# Patient Record
Sex: FEMALE | Race: WHITE | Employment: FULL TIME | URBAN - METROPOLITAN AREA
[De-identification: names, ages, dates, MRNs, and addresses within clinical notes are randomized per-mention and may not be internally consistent; named-entity substitution may affect disease eponyms.]

---

## 2021-06-25 ENCOUNTER — TRANSCRIBE ORDER (OUTPATIENT)
Dept: SCHEDULING | Age: 46
End: 2021-06-25

## 2021-06-25 DIAGNOSIS — M79.631 PAIN OF RIGHT FOREARM: Primary | ICD-10-CM

## 2021-07-08 ENCOUNTER — HOSPITAL ENCOUNTER (OUTPATIENT)
Age: 46
Discharge: HOME | End: 2021-07-08
Payer: COMMERCIAL

## 2021-07-08 DIAGNOSIS — M79.631 PAIN OF RIGHT FOREARM: ICD-10-CM

## 2021-07-08 PROCEDURE — 73218 MRI UPPER EXTREMITY W/O DYE: CPT

## 2024-06-27 ENCOUNTER — OFFICE VISIT (OUTPATIENT)
Age: 49
End: 2024-06-27
Payer: COMMERCIAL

## 2024-06-27 DIAGNOSIS — M25.511 CHRONIC RIGHT SHOULDER PAIN: Primary | ICD-10-CM

## 2024-06-27 DIAGNOSIS — M19.011 PRIMARY OSTEOARTHRITIS OF RIGHT SHOULDER: ICD-10-CM

## 2024-06-27 DIAGNOSIS — G89.29 CHRONIC RIGHT SHOULDER PAIN: Primary | ICD-10-CM

## 2024-06-27 PROCEDURE — 99203 OFFICE O/P NEW LOW 30 MIN: CPT | Performed by: ORTHOPAEDIC SURGERY

## 2024-06-27 PROCEDURE — 20611 DRAIN/INJ JOINT/BURSA W/US: CPT | Performed by: ORTHOPAEDIC SURGERY

## 2024-06-27 PROCEDURE — 73030 X-RAY EXAM OF SHOULDER: CPT | Performed by: ORTHOPAEDIC SURGERY

## 2024-06-27 RX ORDER — LIDOCAINE HYDROCHLORIDE 10 MG/ML
6 INJECTION, SOLUTION INFILTRATION; PERINEURAL ONCE
Status: COMPLETED | OUTPATIENT
Start: 2024-06-27 | End: 2024-07-03

## 2024-06-27 RX ORDER — TRIAMCINOLONE ACETONIDE 40 MG/ML
40 INJECTION, SUSPENSION INTRA-ARTICULAR; INTRAMUSCULAR ONCE
Status: COMPLETED | OUTPATIENT
Start: 2024-06-27 | End: 2024-07-03

## 2024-06-27 NOTE — PROGRESS NOTES
Faiza Thorne  1975   Chief Complaint   Patient presents with    Arm Pain     Patient complaints of right arm pain, muscle spasms, and difficulty sleeping. Patient states she takes 1,000mg of Tylenol and Ibuprofen 2-3x a day.        HISTORY OF PRESENT ILLNESS  Faiza Thorne is a 48 y.o. female who presents today for evaluation of right shoulder pain.  Pain is a 5/10. Pain has been present for 5 years. Pt previously underwent open right biceps tendon repair in 2021. Pt states that her surgeon originally went in arthroscopically and then converted to an open procedure which revealed severe arthritis in her right shoulder. Pt notes that her age prevented her surgeon from converting her original surgery to a shoulder replacement. Pt states that her pain is in her right bicep and not her right shoulder. Pt experiences grinding in her right shoulder. She also experiences right shoulder and arm stiffness. She experiences pain with overhead activities.     Has tried following treatments: Injections:Yes; Brace:No; Therapy:Yes; Cane/Crutch:No      Not on File     Past Medical History:   Diagnosis Date    COVID-19 vaccine series completed     2 DOSES    Depression     Eczema of scalp     Hypertension     Ill-defined condition     RIGHT SHOULDER PAIN    Morbid obesity (HCC)       Social History       Tobacco History       Smoking Status  Former Quit Date  1/1/2019 Smoking Tobacco Type  Cigarettes quit in 1/1/2019   Pack Year History     Packs/Day From To Years    0 1/1/2019  5.5      Smokeless Tobacco Use  Never              Alcohol History       Alcohol Use Status  Yes              Drug Use       Drug Use Status  Never              Sexual Activity       Sexually Active  Not Asked                   Past Surgical History:   Procedure Laterality Date    COLONOSCOPY N/A 4/11/2022    SCREENING COLONOSCOPY performed by Woo Rodriguez MD at [facility] ENDOSCOPY    SHOULDER ARTHROSCOPY Right     TONSILLECTOMY  1980

## 2024-07-03 RX ADMIN — TRIAMCINOLONE ACETONIDE 40 MG: 40 INJECTION, SUSPENSION INTRA-ARTICULAR; INTRAMUSCULAR at 11:16

## 2024-07-03 RX ADMIN — LIDOCAINE HYDROCHLORIDE 6 ML: 10 INJECTION, SOLUTION INFILTRATION; PERINEURAL at 11:16

## 2024-07-16 NOTE — PROGRESS NOTES
Faiza Thorne  1975   Chief Complaint   Patient presents with    Shoulder Pain     right        HISTORY OF PRESENT ILLNESS  Faiza Thorne is a 48 y.o. female who presents today for reevaluation of right shoulder pain. Pain is a 1/10. Pt received a right shoulder Cortisone injection on 6/27/2024 with relief. She notes pain with activities of daily living. She has clicking and popping. She has full ROM. She is currently taking Ibuprofen 800 mg.     Patient denies any fever, chills, chest pain, shortness of breath or calf pain. The remainder of the review of systems is negative. There are no new illness or injuries other than that mentioned above to report since last seen in the office. No changes in medications, allergies, social or family history.      PHYSICAL EXAM:   There were no vitals taken for this visit.  The patient is a well-developed, well-nourished female   in no acute distress.  The patient is alert and oriented times three.  The patient is alert and oriented times three. Mood and affect are normal.  LYMPHATIC: lymph nodes are not enlarged and are within normal limits  SKIN: normal in color and non tender to palpation. There are no bruises or abrasions noted.   NEUROLOGICAL: Motor sensory exam is within normal limits. Reflexes are equal bilaterally. There is normal sensation to pinprick and light touch  MUSCULOSKELETAL: Unchanged    PROCEDURE: none    IMAGING:   XR of the right shoulder with 3 views dated 6/27/2024 obtained in office read and reviewed by Dr. Sahni: Marked degenerative changes in the glenohumeral joint with a large inferior humeral spur       IMPRESSION:      ICD-10-CM    1. Primary osteoarthritis of right shoulder  M19.011 celecoxib (CELEBREX) 200 MG capsule           PLAN:   1. Pt presents today with right shoulder pain secondary to primary OA.  Think she has gotten reasonable relief with the injection.  We discussed treatment options including a total shoulder replacement. I

## 2024-07-18 ENCOUNTER — OFFICE VISIT (OUTPATIENT)
Age: 49
End: 2024-07-18
Payer: COMMERCIAL

## 2024-07-18 DIAGNOSIS — M19.011 PRIMARY OSTEOARTHRITIS OF RIGHT SHOULDER: Primary | ICD-10-CM

## 2024-07-18 PROCEDURE — 99213 OFFICE O/P EST LOW 20 MIN: CPT | Performed by: ORTHOPAEDIC SURGERY

## 2024-07-18 RX ORDER — CELECOXIB 200 MG/1
200 CAPSULE ORAL DAILY
Qty: 60 CAPSULE | Refills: 3 | Status: SHIPPED | OUTPATIENT
Start: 2024-07-18

## 2024-12-12 ENCOUNTER — OFFICE VISIT (OUTPATIENT)
Age: 49
End: 2024-12-12
Payer: COMMERCIAL

## 2024-12-12 DIAGNOSIS — M19.011 PRIMARY OSTEOARTHRITIS OF RIGHT SHOULDER: Primary | ICD-10-CM

## 2024-12-12 PROCEDURE — 99213 OFFICE O/P EST LOW 20 MIN: CPT | Performed by: ORTHOPAEDIC SURGERY

## 2024-12-12 NOTE — PROGRESS NOTES
not providing relief I believe the next steps will be to order a CT arthrogram to further evaluate the right shoulder assess the possibility of rotator cuff tear.  I think at this time we will start him to look at next steps which in all likelihood would include shoulder replacement.  Risk factors include: Obesity  2. No cortisone injection indicated today   3. No Physical/Occupational Therapy indicated today  4. Yes diagnostic test indicated today:   5. No durable medical equipment indicated today  6. No referral indicated today   7. No medications indicated today:   8. No Narcotic indicated today for short term acute pain secondary to  primary OA. Patient given pain medication for short term acute pain relief. Goal is to treat patient according to above plan and to ultimately have patient off all pain medications once appropriate. If chronic pain management is required beyond what is expected for current orthopedic problem, will refer patient to pain management.  was reviewed and will be reviewed with every medication refill request.       RTC following CT       By signing my name below, I VIDAL Akers, attest that this documentation has been prepared under the direction and in the presence of Ryan Rome MD  Electronically signed: VIDAL Akers. 12/12/2024 12:41 PM EST    I, Ryan Rome MD, personally performed the services described in this documentation. I have authorized the scribe to complete the medical record entries input within this chart. I have reviewed the chart and agree that the record reflects my personal performance and is accurate and complete. [Electronically Signed: Ryan Rome MD. 12/12/2024 12:41 PM EST]

## 2024-12-12 NOTE — PATIENT INSTRUCTIONS
If we order a Diagnostic test (such as MRI or CT) during your office visit please see below:     Coordination of Care will be calling you to schedule your diagnostic test. If you have not heard from Coordination of Care within 3 business days, please call or text [phone].     Once you have a date scheduled for your diagnostic test, you will need to contact our office to schedule a follow up appointment, as this is when the physician will review your diagnostic test results with you. You can contact our office to schedule appointment by phone at [phone], or you can send a message via ePar[employer] to request an appointment.    CT arthrogram ordered today, 12/12/2024

## 2024-12-31 ENCOUNTER — HOSPITAL ENCOUNTER (OUTPATIENT)
Facility: HOSPITAL | Age: 49
Discharge: HOME | End: 2025-01-03
Attending: ORTHOPAEDIC SURGERY
Payer: COMMERCIAL

## 2024-12-31 DIAGNOSIS — M19.011 PRIMARY OSTEOARTHRITIS OF RIGHT SHOULDER: ICD-10-CM

## 2024-12-31 PROCEDURE — 2500000003 HC RX 250 WO HCPCS: Performed by: ORTHOPAEDIC SURGERY

## 2024-12-31 PROCEDURE — 6360000004 HC RX CONTRAST MEDICATION: Performed by: ORTHOPAEDIC SURGERY

## 2024-12-31 PROCEDURE — 20610 DRAIN/INJ JOINT/BURSA W/O US: CPT

## 2024-12-31 PROCEDURE — 73201 CT UPPER EXTREMITY W/DYE: CPT

## 2024-12-31 PROCEDURE — 6360000002 HC RX W HCPCS: Performed by: ORTHOPAEDIC SURGERY

## 2024-12-31 RX ORDER — SODIUM CHLORIDE 0.9 % (FLUSH) 0.9 %
5-40 SYRINGE (ML) INJECTION 2 TIMES DAILY
Status: DISCONTINUED | OUTPATIENT
Start: 2024-12-31 | End: 2025-01-04 | Stop reason: HOSPADM

## 2024-12-31 RX ORDER — IOPAMIDOL 408 MG/ML
20 INJECTION, SOLUTION INTRATHECAL
Status: COMPLETED | OUTPATIENT
Start: 2024-12-31 | End: 2024-12-31

## 2024-12-31 RX ORDER — LIDOCAINE HYDROCHLORIDE 10 MG/ML
10 INJECTION, SOLUTION EPIDURAL; INFILTRATION; INTRACAUDAL; PERINEURAL
Status: COMPLETED | OUTPATIENT
Start: 2024-12-31 | End: 2024-12-31

## 2024-12-31 RX ADMIN — SODIUM CHLORIDE, PRESERVATIVE FREE 10 ML: 5 INJECTION INTRAVENOUS at 10:26

## 2024-12-31 RX ADMIN — LIDOCAINE HYDROCHLORIDE 10 ML: 10 INJECTION, SOLUTION EPIDURAL; INFILTRATION; INTRACAUDAL; PERINEURAL at 10:26

## 2024-12-31 RX ADMIN — IOPAMIDOL 20 ML: 408 INJECTION, SOLUTION INTRATHECAL at 10:26

## 2025-01-07 ENCOUNTER — OFFICE VISIT (OUTPATIENT)
Age: 50
End: 2025-01-07
Payer: COMMERCIAL

## 2025-01-07 DIAGNOSIS — M19.011 PRIMARY OSTEOARTHRITIS OF RIGHT SHOULDER: Primary | ICD-10-CM

## 2025-01-07 PROCEDURE — 99214 OFFICE O/P EST MOD 30 MIN: CPT | Performed by: ORTHOPAEDIC SURGERY

## 2025-01-07 NOTE — PROGRESS NOTES
Faiza Thorne  1975   Chief Complaint   Patient presents with    Results     CT scan f/u        HISTORY OF PRESENT ILLNESS  Faiza Thorne is a 49 y.o. female who presents today for reevaluation of right shoulder pain. Pain is a 4/10. She notes previous treatments have not provided relief. She is unable to engage in activities in daily living such as washing dishes, working, and washing her hair. She has limited ROM.    Patient denies any fever, chills, chest pain, shortness of breath or calf pain. The remainder of the review of systems is negative. There are no new illness or injuries other than that mentioned above to report since last seen in the office. No changes in medications, allergies, social or family history.      PHYSICAL EXAM:   There were no vitals taken for this visit.  The patient is a well-developed, well-nourished female   in no acute distress.  The patient is alert and oriented times three.  The patient is alert and oriented times three. Mood and affect are normal.  LYMPHATIC: lymph nodes are not enlarged and are within normal limits  SKIN: normal in color and non tender to palpation. There are no bruises or abrasions noted.   NEUROLOGICAL: Motor sensory exam is within normal limits. Reflexes are equal bilaterally. There is normal sensation to pinprick and light touch  MUSCULOSKELETAL: Unchanged still significant decrease in range of motion with pain         PROCEDURE: none    IMAGING: CT arthrogram of the right shoulder obtained by [facility] on 12/31/2024 reviewed and read by :    IMPRESSION:  1. Moderate/severe glenohumeral osteoarthritis.  2. No significant rotator cuff muscle atrophy.     XR of the right shoulder with 3 views dated 6/27/2024 obtained in office read and reviewed by Dr. Sahni: Marked degenerative changes in the glenohumeral joint with a large inferior humeral spur       IMPRESSION:      ICD-10-CM    1. Primary osteoarthritis of right shoulder  M19.011            PLAN:

## 2025-01-10 DIAGNOSIS — Z01.818 PREOP EXAMINATION: Primary | ICD-10-CM

## 2025-02-20 ENCOUNTER — PREP FOR PROCEDURE (OUTPATIENT)
Age: 50
End: 2025-02-20

## 2025-02-20 DIAGNOSIS — M19.011 ARTHRITIS OF RIGHT SHOULDER REGION: ICD-10-CM

## 2025-03-14 ENCOUNTER — HOSPITAL ENCOUNTER (OUTPATIENT)
Facility: HOSPITAL | Age: 50
Discharge: HOME | End: 2025-03-17
Payer: COMMERCIAL

## 2025-03-14 ENCOUNTER — TRANSCRIBE ORDERS (OUTPATIENT)
Facility: HOSPITAL | Age: 50
End: 2025-03-14

## 2025-03-14 DIAGNOSIS — Z01.818 PREOP EXAMINATION: Primary | ICD-10-CM

## 2025-03-14 DIAGNOSIS — Z01.818 PREOP EXAMINATION: ICD-10-CM

## 2025-03-14 LAB
ABO + RH BLD: NORMAL
ANION GAP SERPL CALC-SCNC: 6 MMOL/L (ref 3–18)
APPEARANCE UR: ABNORMAL
APTT PPP: 25.7 SEC (ref 23–36.4)
BACTERIA URNS QL MICRO: ABNORMAL /HPF
BILIRUB UR QL: NEGATIVE
BLOOD GROUP ANTIBODIES SERPL: NORMAL
BUN SERPL-MCNC: 12 MG/DL (ref 7–18)
BUN/CREAT SERPL: 15 (ref 12–20)
CALCIUM SERPL-MCNC: 9.8 MG/DL (ref 8.5–10.1)
CHLORIDE SERPL-SCNC: 96 MMOL/L (ref 100–111)
CO2 SERPL-SCNC: 33 MMOL/L (ref 21–32)
COLOR UR: YELLOW
CREAT SERPL-MCNC: 0.78 MG/DL (ref 0.6–1.3)
EKG ATRIAL RATE: 66 BPM
EKG DIAGNOSIS: NORMAL
EKG P AXIS: 70 DEGREES
EKG P-R INTERVAL: 172 MS
EKG Q-T INTERVAL: 404 MS
EKG QRS DURATION: 74 MS
EKG QTC CALCULATION (BAZETT): 423 MS
EKG R AXIS: 60 DEGREES
EKG T AXIS: 64 DEGREES
EKG VENTRICULAR RATE: 66 BPM
EPITH CASTS URNS QL MICRO: ABNORMAL /LPF (ref 0–5)
ERYTHROCYTE [DISTWIDTH] IN BLOOD BY AUTOMATED COUNT: 13.3 % (ref 11.6–14.5)
GLUCOSE SERPL-MCNC: 88 MG/DL (ref 74–99)
GLUCOSE UR STRIP.AUTO-MCNC: NEGATIVE MG/DL
HCT VFR BLD AUTO: 46.3 % (ref 35–45)
HGB BLD-MCNC: 14.9 G/DL (ref 12–16)
HGB UR QL STRIP: NEGATIVE
INR PPP: 0.9 (ref 0.9–1.1)
KETONES UR QL STRIP.AUTO: ABNORMAL MG/DL
LEUKOCYTE ESTERASE UR QL STRIP.AUTO: NEGATIVE
MCH RBC QN AUTO: 28.7 PG (ref 24–34)
MCHC RBC AUTO-ENTMCNC: 32.2 G/DL (ref 31–37)
MCV RBC AUTO: 89 FL (ref 78–100)
NITRITE UR QL STRIP.AUTO: NEGATIVE
NRBC # BLD: 0 K/UL (ref 0–0.01)
NRBC BLD-RTO: 0 PER 100 WBC
PH UR STRIP: 5 (ref 5–8)
PLATELET # BLD AUTO: 270 K/UL (ref 135–420)
PMV BLD AUTO: 9.6 FL (ref 9.2–11.8)
POTASSIUM SERPL-SCNC: 3.8 MMOL/L (ref 3.5–5.5)
PROT UR STRIP-MCNC: ABNORMAL MG/DL
PROTHROMBIN TIME: 12.6 SEC (ref 11.9–14.9)
RBC # BLD AUTO: 5.2 M/UL (ref 4.2–5.3)
RBC #/AREA URNS HPF: ABNORMAL /HPF (ref 0–5)
SODIUM SERPL-SCNC: 135 MMOL/L (ref 136–145)
SP GR UR REFRACTOMETRY: >1.03 (ref 1–1.03)
SPECIMEN EXP DATE BLD: NORMAL
UROBILINOGEN UR QL STRIP.AUTO: 1 EU/DL (ref 0.2–1)
WBC # BLD AUTO: 8.4 K/UL (ref 4.6–13.2)
WBC URNS QL MICRO: ABNORMAL /HPF (ref 0–4)

## 2025-03-14 PROCEDURE — 86850 RBC ANTIBODY SCREEN: CPT

## 2025-03-14 PROCEDURE — 86901 BLOOD TYPING SEROLOGIC RH(D): CPT

## 2025-03-14 PROCEDURE — 80048 BASIC METABOLIC PNL TOTAL CA: CPT

## 2025-03-14 PROCEDURE — 93005 ELECTROCARDIOGRAM TRACING: CPT

## 2025-03-14 PROCEDURE — 71046 X-RAY EXAM CHEST 2 VIEWS: CPT

## 2025-03-14 PROCEDURE — 36415 COLL VENOUS BLD VENIPUNCTURE: CPT

## 2025-03-14 PROCEDURE — 85610 PROTHROMBIN TIME: CPT

## 2025-03-14 PROCEDURE — 85730 THROMBOPLASTIN TIME PARTIAL: CPT

## 2025-03-14 PROCEDURE — 86900 BLOOD TYPING SEROLOGIC ABO: CPT

## 2025-03-14 PROCEDURE — 81001 URINALYSIS AUTO W/SCOPE: CPT

## 2025-03-14 PROCEDURE — 93010 ELECTROCARDIOGRAM REPORT: CPT | Performed by: INTERNAL MEDICINE

## 2025-03-14 PROCEDURE — 85027 COMPLETE CBC AUTOMATED: CPT

## 2025-03-14 RX ORDER — ESCITALOPRAM OXALATE 20 MG/1
20 TABLET ORAL DAILY
COMMUNITY
Start: 2025-02-14

## 2025-03-14 RX ORDER — TRAMADOL HYDROCHLORIDE 50 MG/1
1 TABLET ORAL EVERY 6 HOURS
COMMUNITY
Start: 2024-09-12

## 2025-03-14 RX ORDER — SENNOSIDES 8.6 MG
1300 CAPSULE ORAL 2 TIMES DAILY
COMMUNITY

## 2025-03-14 RX ORDER — GUSELKUMAB 100 MG/ML
100 INJECTION SUBCUTANEOUS
COMMUNITY

## 2025-03-14 RX ORDER — IBUPROFEN 200 MG
4 TABLET ORAL 2 TIMES DAILY
COMMUNITY

## 2025-03-14 RX ORDER — SEMAGLUTIDE 1 MG/.5ML
1 INJECTION, SOLUTION SUBCUTANEOUS
COMMUNITY
Start: 2024-09-12

## 2025-03-14 RX ORDER — LISINOPRIL AND HYDROCHLOROTHIAZIDE 20; 25 MG/1; MG/1
1 TABLET ORAL DAILY
COMMUNITY
Start: 2025-02-10

## 2025-03-14 NOTE — PROGRESS NOTES
Instructions for your surgery at [facility]      Today's Date:  3/14/2025      Patient's Name:  Faiza Thorne           Surgery Date:  March 27, 2025              Please enter the main entrance of the hospital and check-in at the front security desk located in the lobby. They will direct you to the area to report for your surgery.     Do NOT eat or drink anything, including candy, gum, or ice chips after midnight prior to your surgery, unless you have specific instructions from your surgeon or anesthesia provider to do so.  Brush your teeth before coming to the hospital. You may swish with water, but do not swallow.  No smoking/Vaping/E-Cigarettes 24 hours prior to the day of surgery.  No alcohol 24 hours prior to the day of surgery.  No recreational drugs for one week prior to the day of surgery.  Bring Photo ID, Insurance information, and Co-pay if required on day of surgery.  Bring in pertinent legal documents, such as, Medical Power of , DNR, Advance Directive, etc.  Leave all valuables, including money/purse, weapons at home.  Remove all jewelry, including ALL body piercings, nail polish, acrylic nails, and makeup (including mascara); no lotions, powders, deodorant, or perfume/cologne/after shave on the skin.  Follow instruction for Hibiclens washes and CHG wipes from surgeon's office.   Glasses and dentures may be worn to the hospital. They must be removed prior to surgery. Please bring case/container for glasses or dentures.   Contact lenses should not be worn on day of surgery.   Call your doctor's office if symptoms of a cold or illness develop within 24-48 hours prior to your surgery.  Call your doctor's office if you have any questions concerning insurance or co-pays.  15. AN ADULT (relative or friend 18 years or older) MUST DRIVE YOU HOME AFTER YOUR SURGERY.  16. Please make arrangements for a responsible adult (18 years or older) to be with you for 24 hours after your

## 2025-03-14 NOTE — DISCHARGE INSTRUCTIONS
Dr. Sahni’s Total Shoulder Postoperative Information    How to manage your pain after your Surgery:  After your surgery it is expected that you will have some pain.  In efforts to reduce the amounts of side effects from pain medications we would like you to follow the below medication regimen after surgery:  Take 1000mg of Tylenol by mouth every 8 hours x 5 days. This is 4 tabs of regular strength Tylenol or 2 tabs of Extra Strength Tylenol  We would like you to take a NSAID medication for the first 3 days following surgery.  In efforts to avoid additional prescription costs we recommend one of the following over the counter medications.     600mg of Ibuprofen every 8 hours x 3 days    OR   500mg of Naproxen (Aleve) every 12 hours x 3 days  If you have a prescription for Meloxicam or Celebrex already you may resume this as previously prescribed instead of the Over the Counter Medications.  DO NOT TAKE ANY OF THESE IF YOU HAVE CHRONIC RENAL FAILURE, ARE TAKING A BLOOD THINNER, HAVE A HISTORY OF A GASTRIC BLEED OR ARE ALLERGIC TO ASPIRIN. IT IS OK TO TAKE IF YOU HAVE HISTORY OF GASTRIC BYPASS SURGERY.    Then ONLY IF YOUR PAIN IS UNCONTROLLED you may take your Narcotic Pain medication as prescribed. THIS IS THE PRESCRIPTION THAT WAS GIVEN TO YOU IN THE OFFICE.    You should place an ice bag over your shoulder to help with pain and reduce swelling.    Your shoulder may be sore and develop bruising over the next several days.  The bruising should resolve and no special care will be needed.    Leave your bandage on until we see you in the office next week. It is safe to take a shower when you get home    You will be given a sling to use. Continue to wear this while you are active or up and moving around. OK to take off if you are sedentary. No moving the arm away from your body on your own, reaching or carrying with the operated arm.      There has been an operation inside and around the shoulder joint. Complete

## 2025-03-19 DIAGNOSIS — N30.00 ACUTE CYSTITIS WITHOUT HEMATURIA: Primary | ICD-10-CM

## 2025-03-19 RX ORDER — CIPROFLOXACIN 250 MG/1
500 TABLET, FILM COATED ORAL 2 TIMES DAILY
Qty: 28 TABLET | Refills: 0 | Status: SHIPPED | OUTPATIENT
Start: 2025-03-19 | End: 2025-03-26

## 2025-03-21 ENCOUNTER — OFFICE VISIT (OUTPATIENT)
Age: 50
End: 2025-03-21

## 2025-03-21 VITALS
BODY MASS INDEX: 51.05 KG/M2 | SYSTOLIC BLOOD PRESSURE: 145 MMHG | HEIGHT: 62 IN | WEIGHT: 277.4 LBS | DIASTOLIC BLOOD PRESSURE: 83 MMHG

## 2025-03-21 DIAGNOSIS — M19.011 PRIMARY OSTEOARTHRITIS OF RIGHT SHOULDER: Primary | ICD-10-CM

## 2025-03-21 DIAGNOSIS — Z01.818 PREOP EXAMINATION: ICD-10-CM

## 2025-03-21 PROCEDURE — 99024 POSTOP FOLLOW-UP VISIT: CPT | Performed by: NURSE PRACTITIONER

## 2025-03-21 RX ORDER — OXYCODONE HYDROCHLORIDE 5 MG/1
5 TABLET ORAL EVERY 4 HOURS PRN
Qty: 40 TABLET | Refills: 0 | Status: SHIPPED | OUTPATIENT
Start: 2025-03-21 | End: 2025-03-28

## 2025-03-21 NOTE — PROGRESS NOTES
MG tablet Take 4 tablets by mouth in the morning and at bedtime And prn    lisinopril-hydroCHLOROthiazide (PRINZIDE;ZESTORETIC) 20-25 MG per tablet Take 1 tablet by mouth daily    WEGOVY 1 MG/0.5ML SOAJ SC injection Inject 1 mg into the skin every 7 days Every Saturday    TREMFYA 100 MG/ML SOAJ Inject 100 mg into the skin every 2 months    traMADol (ULTRAM) 50 MG tablet Take 1 tablet by mouth every 6 hours.    escitalopram (LEXAPRO) 20 MG tablet Take 1 tablet by mouth daily    celecoxib (CELEBREX) 200 MG capsule Take 1 capsule by mouth daily (Patient not taking: Reported on 3/14/2025)    traZODone (DESYREL) 100 MG tablet Take 1 tablet by mouth nightly as needed     No current facility-administered medications for this visit.         ALLERGIES:     No Known Allergies      SURGICAL HISTORY:     Past Surgical History:   Procedure Laterality Date    COLONOSCOPY N/A 4/11/2022    SCREENING COLONOSCOPY performed by Woo Rodriguez MD at [facility] ENDOSCOPY    SHOULDER ARTHROSCOPY Right     TONSILLECTOMY  1980    TUBAL LIGATION      TYMPANOSTOMY TUBE PLACEMENT Bilateral     as a child        SOCIAL HISTORY:     Social History       Tobacco History       Smoking Status  Every Day Last Attempt to Quit  1/1/2019 Smoking Tobacco Type  Cigarettes last attempt to quit 1/1/2019   Pack Year History     Packs/Day From To Years    0 1/1/2019  6.2      Smokeless Tobacco Use  Never      Tobacco Comments  Smokes 1 or 2 daily              Alcohol History       Alcohol Use Status  Not Currently              Drug Use       Drug Use Status  Never              Sexual Activity       Sexually Active  Not Asked                     FAMILY HISTORY:     Family History   Problem Relation Age of Onset    Hypertension Mother        REVIEW OF SYSTEMS:     Negative for fevers, chills, chest pain, shortness of breath, weight loss, recent illness     General: Negative for fever and chills. No unexpected change in weight.  Denies fatigue. No change in

## 2025-03-25 NOTE — H&P
HISTORY AND PHYSICAL          Patient: Faiza Thorne                MRN: [id number]       SSN: [id number]  YOB: 1975          AGE: 49 y.o.          SEX: female      Patient scheduled for:  RIGHT STEAMLESS TOTAL SHOULDER REPLACEMENT 3/27/2025    Surgeon: Ryan Sahni MD    ANESTHESIA TYPE:  General    HISTORY:     The patient was seen in the office today for a preoperative history and physical for an upcoming above listed surgery.  The patient is a pleasant 49 y.o. female who has a history of arthritis right shoulder . Pain level is a 6/10.      Due to the current findings, affected activity of daily living and continued pain and discomfort, surgical intervention is indicated. The alternatives, risks, and complications, including but not limited to infection, blood loss, need for blood transfusion, neurovascular damage, perla-incisional numbness, subcutaneous hematoma, bone fracture, anesthetic complications, DVT, PE, death, RSD, postoperative stiffness and pain, possible surgical scar, delayed healing and nonhealing, reflexive sympathetic dystrophy, damage to blood vessels and nerves, need for more surgery, MI, and stroke,  failure of hardware, gait disturbances,have been discussed.  The patient understands and wishes to proceed with surgery.     PAST MEDICAL HISTORY:     Past Medical History:   Diagnosis Date    Arthritis     COVID-19 vaccine series completed     2 DOSES    Depression     Eczema of scalp     Hypertension     Ill-defined condition     RIGHT SHOULDER PAIN    Morbid obesity     Psoriasis     Subclinical hypothyroidism     no meds        CURRENT MEDICATIONS:     No current facility-administered medications for this encounter.     Current Outpatient Medications   Medication Sig    acetaminophen (TYLENOL 8 HOUR ARTHRITIS PAIN) 650 MG extended release tablet Take 2 tablets by mouth in the morning and at bedtime And as needed    ibuprofen (ADVIL;MOTRIN) 200 MG tablet Take 4

## 2025-03-26 ENCOUNTER — ANESTHESIA EVENT (OUTPATIENT)
Facility: HOSPITAL | Age: 50
End: 2025-03-26
Payer: COMMERCIAL

## 2025-03-27 ENCOUNTER — ANESTHESIA (OUTPATIENT)
Facility: HOSPITAL | Age: 50
End: 2025-03-27
Payer: COMMERCIAL

## 2025-03-27 ENCOUNTER — HOSPITAL ENCOUNTER (OUTPATIENT)
Facility: HOSPITAL | Age: 50
Setting detail: OUTPATIENT SURGERY
Discharge: HOME | End: 2025-03-27
Attending: ORTHOPAEDIC SURGERY | Admitting: ORTHOPAEDIC SURGERY
Payer: COMMERCIAL

## 2025-03-27 VITALS
HEIGHT: 62 IN | HEART RATE: 91 BPM | BODY MASS INDEX: 51.45 KG/M2 | DIASTOLIC BLOOD PRESSURE: 69 MMHG | SYSTOLIC BLOOD PRESSURE: 126 MMHG | WEIGHT: 279.6 LBS | RESPIRATION RATE: 18 BRPM | OXYGEN SATURATION: 94 % | TEMPERATURE: 98.2 F

## 2025-03-27 LAB — HCG UR QL: NEGATIVE

## 2025-03-27 PROCEDURE — 6360000002 HC RX W HCPCS: Performed by: NURSE ANESTHETIST, CERTIFIED REGISTERED

## 2025-03-27 PROCEDURE — 6370000000 HC RX 637 (ALT 250 FOR IP): Performed by: NURSE ANESTHETIST, CERTIFIED REGISTERED

## 2025-03-27 PROCEDURE — 2580000003 HC RX 258: Performed by: NURSE ANESTHETIST, CERTIFIED REGISTERED

## 2025-03-27 PROCEDURE — L3650 SO 8 ABD RESTRAINT PRE OTS: HCPCS | Performed by: ORTHOPAEDIC SURGERY

## 2025-03-27 PROCEDURE — C1713 ANCHOR/SCREW BN/BN,TIS/BN: HCPCS | Performed by: ORTHOPAEDIC SURGERY

## 2025-03-27 PROCEDURE — 3700000001 HC ADD 15 MINUTES (ANESTHESIA): Performed by: ORTHOPAEDIC SURGERY

## 2025-03-27 PROCEDURE — 2500000003 HC RX 250 WO HCPCS: Performed by: NURSE ANESTHETIST, CERTIFIED REGISTERED

## 2025-03-27 PROCEDURE — 3600000002 HC SURGERY LEVEL 2 BASE: Performed by: ORTHOPAEDIC SURGERY

## 2025-03-27 PROCEDURE — 6360000002 HC RX W HCPCS: Performed by: ANESTHESIOLOGY

## 2025-03-27 PROCEDURE — 7100000001 HC PACU RECOVERY - ADDTL 15 MIN: Performed by: ORTHOPAEDIC SURGERY

## 2025-03-27 PROCEDURE — 2720000010 HC SURG SUPPLY STERILE: Performed by: ORTHOPAEDIC SURGERY

## 2025-03-27 PROCEDURE — 7100000000 HC PACU RECOVERY - FIRST 15 MIN: Performed by: ORTHOPAEDIC SURGERY

## 2025-03-27 PROCEDURE — 2500000003 HC RX 250 WO HCPCS: Performed by: NURSE PRACTITIONER

## 2025-03-27 PROCEDURE — 6360000002 HC RX W HCPCS: Performed by: NURSE PRACTITIONER

## 2025-03-27 PROCEDURE — 64415 NJX AA&/STRD BRCH PLXS IMG: CPT | Performed by: ANESTHESIOLOGY

## 2025-03-27 PROCEDURE — 3600000012 HC SURGERY LEVEL 2 ADDTL 15MIN: Performed by: ORTHOPAEDIC SURGERY

## 2025-03-27 PROCEDURE — 81025 URINE PREGNANCY TEST: CPT

## 2025-03-27 PROCEDURE — 3700000000 HC ANESTHESIA ATTENDED CARE: Performed by: ORTHOPAEDIC SURGERY

## 2025-03-27 PROCEDURE — 2580000003 HC RX 258: Performed by: ORTHOPAEDIC SURGERY

## 2025-03-27 PROCEDURE — 6360000002 HC RX W HCPCS: Performed by: ORTHOPAEDIC SURGERY

## 2025-03-27 PROCEDURE — 7100000011 HC PHASE II RECOVERY - ADDTL 15 MIN: Performed by: ORTHOPAEDIC SURGERY

## 2025-03-27 PROCEDURE — 7100000010 HC PHASE II RECOVERY - FIRST 15 MIN: Performed by: ORTHOPAEDIC SURGERY

## 2025-03-27 PROCEDURE — 6370000000 HC RX 637 (ALT 250 FOR IP): Performed by: ORTHOPAEDIC SURGERY

## 2025-03-27 PROCEDURE — 2709999900 HC NON-CHARGEABLE SUPPLY: Performed by: ORTHOPAEDIC SURGERY

## 2025-03-27 DEVICE — ECLIPSE CAGE SCREW M 35MM: Type: IMPLANTABLE DEVICE | Site: SHOULDER | Status: FUNCTIONAL

## 2025-03-27 DEVICE — CEMENT BNE 40 GM RADIOPAQUE BA SIMPLEX P: Type: IMPLANTABLE DEVICE | Site: SHOULDER | Status: FUNCTIONAL

## 2025-03-27 DEVICE — IMPLANTABLE DEVICE: Type: IMPLANTABLE DEVICE | Site: SHOULDER | Status: FUNCTIONAL

## 2025-03-27 DEVICE — SYSTEM IMPLANT ECLIPSE SPEEDCAP: Type: IMPLANTABLE DEVICE | Site: SHOULDER | Status: FUNCTIONAL

## 2025-03-27 DEVICE — ANCHOR SUTURE BIOCOMP 4.75X19.1 MM SWIVELOCK C: Type: IMPLANTABLE DEVICE | Site: SHOULDER | Status: FUNCTIONAL

## 2025-03-27 DEVICE — COMPONENT GLEN FIX SM SHLDR UNIVERS VAULTLOCK: Type: IMPLANTABLE DEVICE | Site: SHOULDER | Status: FUNCTIONAL

## 2025-03-27 DEVICE — DEVICE GRFT FIX 4.75X19.1 MM ANCHR IMPL BIOCOMP SWIVELOCK: Type: IMPLANTABLE DEVICE | Site: SHOULDER | Status: FUNCTIONAL

## 2025-03-27 RX ORDER — FENTANYL CITRATE 50 UG/ML
25 INJECTION, SOLUTION INTRAMUSCULAR; INTRAVENOUS EVERY 5 MIN PRN
Status: DISCONTINUED | OUTPATIENT
Start: 2025-03-27 | End: 2025-03-27 | Stop reason: HOSPADM

## 2025-03-27 RX ORDER — LIDOCAINE HYDROCHLORIDE 10 MG/ML
1 INJECTION, SOLUTION EPIDURAL; INFILTRATION; INTRACAUDAL; PERINEURAL
Status: DISCONTINUED | OUTPATIENT
Start: 2025-03-27 | End: 2025-03-27 | Stop reason: HOSPADM

## 2025-03-27 RX ORDER — GLUCAGON 1 MG
1 KIT INJECTION PRN
Status: DISCONTINUED | OUTPATIENT
Start: 2025-03-27 | End: 2025-03-27 | Stop reason: HOSPADM

## 2025-03-27 RX ORDER — DEXTROSE MONOHYDRATE 100 MG/ML
INJECTION, SOLUTION INTRAVENOUS CONTINUOUS PRN
Status: DISCONTINUED | OUTPATIENT
Start: 2025-03-27 | End: 2025-03-27 | Stop reason: HOSPADM

## 2025-03-27 RX ORDER — LIDOCAINE HYDROCHLORIDE 20 MG/ML
INJECTION, SOLUTION EPIDURAL; INFILTRATION; INTRACAUDAL; PERINEURAL
Status: DISCONTINUED | OUTPATIENT
Start: 2025-03-27 | End: 2025-03-27 | Stop reason: SDUPTHER

## 2025-03-27 RX ORDER — PROMETHAZINE HYDROCHLORIDE 12.5 MG/1
12.5 TABLET ORAL ONCE
Status: COMPLETED | OUTPATIENT
Start: 2025-03-27 | End: 2025-03-27

## 2025-03-27 RX ORDER — PHENYLEPHRINE HCL IN 0.9% NACL 1 MG/10 ML
SYRINGE (ML) INTRAVENOUS
Status: DISCONTINUED | OUTPATIENT
Start: 2025-03-27 | End: 2025-03-27 | Stop reason: SDUPTHER

## 2025-03-27 RX ORDER — ROPIVACAINE HYDROCHLORIDE 5 MG/ML
30 INJECTION, SOLUTION EPIDURAL; INFILTRATION; PERINEURAL ONCE
Status: COMPLETED | OUTPATIENT
Start: 2025-03-27 | End: 2025-03-27

## 2025-03-27 RX ORDER — FAMOTIDINE 20 MG/1
20 TABLET, FILM COATED ORAL ONCE
Status: COMPLETED | OUTPATIENT
Start: 2025-03-27 | End: 2025-03-27

## 2025-03-27 RX ORDER — FENTANYL CITRATE 50 UG/ML
INJECTION, SOLUTION INTRAMUSCULAR; INTRAVENOUS
Status: DISCONTINUED | OUTPATIENT
Start: 2025-03-27 | End: 2025-03-27 | Stop reason: SDUPTHER

## 2025-03-27 RX ORDER — ROPIVACAINE HYDROCHLORIDE 5 MG/ML
INJECTION, SOLUTION EPIDURAL; INFILTRATION; PERINEURAL
Status: COMPLETED | OUTPATIENT
Start: 2025-03-27 | End: 2025-03-27

## 2025-03-27 RX ORDER — ROCURONIUM BROMIDE 10 MG/ML
INJECTION, SOLUTION INTRAVENOUS
Status: DISCONTINUED | OUTPATIENT
Start: 2025-03-27 | End: 2025-03-27 | Stop reason: SDUPTHER

## 2025-03-27 RX ORDER — SODIUM CHLORIDE, SODIUM LACTATE, POTASSIUM CHLORIDE, CALCIUM CHLORIDE 600; 310; 30; 20 MG/100ML; MG/100ML; MG/100ML; MG/100ML
INJECTION, SOLUTION INTRAVENOUS CONTINUOUS
Status: DISCONTINUED | OUTPATIENT
Start: 2025-03-27 | End: 2025-03-27 | Stop reason: HOSPADM

## 2025-03-27 RX ORDER — DEXAMETHASONE SODIUM PHOSPHATE 4 MG/ML
INJECTION, SOLUTION INTRA-ARTICULAR; INTRALESIONAL; INTRAMUSCULAR; INTRAVENOUS; SOFT TISSUE
Status: DISCONTINUED | OUTPATIENT
Start: 2025-03-27 | End: 2025-03-27 | Stop reason: SDUPTHER

## 2025-03-27 RX ORDER — ONDANSETRON 2 MG/ML
4 INJECTION INTRAMUSCULAR; INTRAVENOUS
Status: DISCONTINUED | OUTPATIENT
Start: 2025-03-27 | End: 2025-03-27 | Stop reason: HOSPADM

## 2025-03-27 RX ORDER — KETOROLAC TROMETHAMINE 15 MG/ML
INJECTION, SOLUTION INTRAMUSCULAR; INTRAVENOUS
Status: DISCONTINUED | OUTPATIENT
Start: 2025-03-27 | End: 2025-03-27 | Stop reason: SDUPTHER

## 2025-03-27 RX ORDER — PROPOFOL 10 MG/ML
INJECTION, EMULSION INTRAVENOUS
Status: DISCONTINUED | OUTPATIENT
Start: 2025-03-27 | End: 2025-03-27 | Stop reason: SDUPTHER

## 2025-03-27 RX ORDER — SUCCINYLCHOLINE/SOD CL,ISO/PF 100 MG/5ML
SYRINGE (ML) INTRAVENOUS
Status: DISCONTINUED | OUTPATIENT
Start: 2025-03-27 | End: 2025-03-27 | Stop reason: SDUPTHER

## 2025-03-27 RX ORDER — MIDAZOLAM HYDROCHLORIDE 2 MG/2ML
2 INJECTION, SOLUTION INTRAMUSCULAR; INTRAVENOUS ONCE
Status: COMPLETED | OUTPATIENT
Start: 2025-03-27 | End: 2025-03-27

## 2025-03-27 RX ORDER — NALOXONE HYDROCHLORIDE 0.4 MG/ML
INJECTION, SOLUTION INTRAMUSCULAR; INTRAVENOUS; SUBCUTANEOUS PRN
Status: DISCONTINUED | OUTPATIENT
Start: 2025-03-27 | End: 2025-03-27 | Stop reason: HOSPADM

## 2025-03-27 RX ORDER — FENTANYL CITRATE 50 UG/ML
100 INJECTION, SOLUTION INTRAMUSCULAR; INTRAVENOUS ONCE
Status: COMPLETED | OUTPATIENT
Start: 2025-03-27 | End: 2025-03-27

## 2025-03-27 RX ORDER — SODIUM CHLORIDE 0.9 % (FLUSH) 0.9 %
5-40 SYRINGE (ML) INJECTION PRN
Status: DISCONTINUED | OUTPATIENT
Start: 2025-03-27 | End: 2025-03-27 | Stop reason: HOSPADM

## 2025-03-27 RX ORDER — ONDANSETRON 2 MG/ML
INJECTION INTRAMUSCULAR; INTRAVENOUS
Status: DISCONTINUED | OUTPATIENT
Start: 2025-03-27 | End: 2025-03-27 | Stop reason: SDUPTHER

## 2025-03-27 RX ADMIN — FENTANYL CITRATE 50 MCG: 50 INJECTION INTRAMUSCULAR; INTRAVENOUS at 07:40

## 2025-03-27 RX ADMIN — WATER 3000 MG: 1 INJECTION INTRAMUSCULAR; INTRAVENOUS; SUBCUTANEOUS at 07:45

## 2025-03-27 RX ADMIN — FENTANYL CITRATE 25 MCG: 50 INJECTION INTRAMUSCULAR; INTRAVENOUS at 09:59

## 2025-03-27 RX ADMIN — Medication 160 MG: at 07:40

## 2025-03-27 RX ADMIN — SODIUM CHLORIDE, SODIUM LACTATE, POTASSIUM CHLORIDE, AND CALCIUM CHLORIDE: 600; 310; 30; 20 INJECTION, SOLUTION INTRAVENOUS at 09:03

## 2025-03-27 RX ADMIN — ROPIVACAINE HYDROCHLORIDE 20 ML: 5 INJECTION, SOLUTION EPIDURAL; INFILTRATION; PERINEURAL at 07:20

## 2025-03-27 RX ADMIN — ROCURONIUM BROMIDE 10 MG: 50 INJECTION INTRAVENOUS at 09:06

## 2025-03-27 RX ADMIN — ROCURONIUM BROMIDE 10 MG: 50 INJECTION INTRAVENOUS at 07:40

## 2025-03-27 RX ADMIN — Medication 50 MCG: at 10:04

## 2025-03-27 RX ADMIN — Medication 200 MCG: at 07:49

## 2025-03-27 RX ADMIN — FAMOTIDINE 20 MG: 20 TABLET, FILM COATED ORAL at 06:57

## 2025-03-27 RX ADMIN — ONDANSETRON 4 MG: 2 INJECTION, SOLUTION INTRAMUSCULAR; INTRAVENOUS at 10:23

## 2025-03-27 RX ADMIN — MIDAZOLAM HYDROCHLORIDE 2 MG: 1 INJECTION, SOLUTION INTRAMUSCULAR; INTRAVENOUS at 07:24

## 2025-03-27 RX ADMIN — KETOROLAC TROMETHAMINE 15 MG: 15 INJECTION, SOLUTION INTRAMUSCULAR; INTRAVENOUS at 10:34

## 2025-03-27 RX ADMIN — FENTANYL CITRATE 100 MCG: 0.05 INJECTION, SOLUTION INTRAMUSCULAR; INTRAVENOUS at 07:24

## 2025-03-27 RX ADMIN — DEXAMETHASONE SODIUM PHOSPHATE 8 MG: 4 INJECTION INTRA-ARTICULAR; INTRALESIONAL; INTRAMUSCULAR; INTRAVENOUS; SOFT TISSUE at 07:45

## 2025-03-27 RX ADMIN — ROCURONIUM BROMIDE 40 MG: 50 INJECTION INTRAVENOUS at 07:53

## 2025-03-27 RX ADMIN — PROPOFOL 200 MG: 10 INJECTION, EMULSION INTRAVENOUS at 07:40

## 2025-03-27 RX ADMIN — ROPIVACAINE HYDROCHLORIDE 30 ML: 5 INJECTION EPIDURAL; INFILTRATION; PERINEURAL at 07:28

## 2025-03-27 RX ADMIN — SODIUM CHLORIDE, SODIUM LACTATE, POTASSIUM CHLORIDE, AND CALCIUM CHLORIDE: 600; 310; 30; 20 INJECTION, SOLUTION INTRAVENOUS at 06:46

## 2025-03-27 RX ADMIN — LIDOCAINE HYDROCHLORIDE 100 MG: 20 INJECTION, SOLUTION EPIDURAL; INFILTRATION; INTRACAUDAL; PERINEURAL at 07:40

## 2025-03-27 RX ADMIN — SUGAMMADEX 200 MG: 100 INJECTION, SOLUTION INTRAVENOUS at 10:41

## 2025-03-27 RX ADMIN — Medication 50 MCG: at 08:40

## 2025-03-27 RX ADMIN — PROMETHAZINE HYDROCHLORIDE 12.5 MG: 12.5 TABLET ORAL at 06:57

## 2025-03-27 RX ADMIN — FENTANYL CITRATE 50 MCG: 50 INJECTION INTRAMUSCULAR; INTRAVENOUS at 08:07

## 2025-03-27 RX ADMIN — ROCURONIUM BROMIDE 20 MG: 50 INJECTION INTRAVENOUS at 08:20

## 2025-03-27 RX ADMIN — FENTANYL CITRATE 25 MCG: 50 INJECTION INTRAMUSCULAR; INTRAVENOUS at 09:27

## 2025-03-27 ASSESSMENT — PAIN - FUNCTIONAL ASSESSMENT: PAIN_FUNCTIONAL_ASSESSMENT: 0-10

## 2025-03-27 ASSESSMENT — PAIN SCALES - GENERAL
PAINLEVEL_OUTOF10: 0

## 2025-03-27 NOTE — ANESTHESIA POSTPROCEDURE EVALUATION
Department of Anesthesiology  Postprocedure Note    Patient: Faiza Thorne  MRN: [id number]  YOB: 1975  Date of evaluation: 3/27/2025    Procedure Summary       Date: 03/27/25 Room / Location: [facility] MAIN 05 / [facility] MAIN OR    Anesthesia Start: 0730 Anesthesia Stop: 1103    Procedure: RIGHT STEMLESS SHOULDER TOTAL ARTHROPLASTY; [[employer]]; NERVE BLOCK (Right: Shoulder) Diagnosis:       Arthritis of right shoulder region      (Arthritis of right shoulder region [M19.011])    Surgeons: Ryan Rome MD Responsible Provider: Suraj Rubalcava Jr., MD    Anesthesia Type: General, Regional ASA Status: 3            Anesthesia Type: General, Regional    Edwardo Phase I: Edwardo Score: 10    Edwardo Phase II: Edwardo Score: 10    Anesthesia Post Evaluation    Patient location during evaluation: bedside  Patient participation: complete - patient participated  Level of consciousness: awake  Airway patency: patent  Nausea & Vomiting: no nausea  Cardiovascular status: hemodynamically stable  Respiratory status: acceptable  Hydration status: euvolemic  Multimodal analgesia pain management approach  Pain management: adequate    No notable events documented.

## 2025-03-27 NOTE — OP NOTE
Operative Note      Patient: Faiza Thorne  YOB: 1975  MRN: [id number]    Date of Procedure: 3/27/2025    Pre-Op Diagnosis Codes:      * Arthritis of right shoulder region [M19.011]    Post-Op Diagnosis:  Glenohumeral arthritis right shoulder       Procedure(s):  RIGHT STEMLESS SHOULDER TOTAL ARTHROPLASTY; [[employer]]; NERVE BLOCK    Surgeon(s):  Ryan Rome MD    Assistant:   Surgical Assistant: Rajesh Fraire Danny    Anesthesia: General    Estimated Blood Loss (mL): less than 100     Complications: None    Specimens:   * No specimens in log *    Implants:  Implant Name Type Inv. Item Serial No.  Lot No. LRB No. Used Action   CEMENT BNE 40 GM RADIOPAQUE BA SIMPLEX P - [id number]  CEMENT BNE 40 GM RADIOPAQUE BA SIMPLEX P  [employer] [facility] [id number] Right 1 Implanted   COMPONENT AMBER FIX [facility] - [id number]  COMPONENT AMBER FIX [facility]  [employer] [id number] Right 1 Implanted   ECLIPSE TRUNION 39MM SLOTTED TPS CAP - [id number]  ECLIPSE TRUNION 39MM SLOTTED TPS CAP  [employer] [id number] Right 1 Implanted   ECLIPSE CAGE SCREW M 35MM - [id number]  ECLIPSE CAGE SCREW M 35MM  [employer] [id number] Right 1 Implanted   SYSTEM IMPLANT ECLIPSE SPEEDCAP - [id number]  SYSTEM IMPLANT ECLIPSE SPEEDCAP  [employer] [id number] Right 1 Implanted   DEVICE GRFT FIX 4.75X19.1 MM ANCHR IMPL BIOCOMP SWIVELOCK - [id number]  DEVICE GRFT FIX 4.75X19.1 MM ANCHR IMPL BIOCOMP SWIVELOCK  [employer] [id number] Right 1 Implanted   ANCHOR SUTURE BIOCOMP 4.75X19.1 MM SWIVELOCK C - [id number]  ANCHOR SUTURE BIOCOMP 4.75X19.1 MM SWIVELOCK C  [employer] [id number] Right 1 Implanted   [employer] ECLIPSE HUMERAL HEAD 39/16 - [id number]  [employer] ECLIPSE HUMERAL HEAD 39/16  [employer] [id number] Right 1 Implanted         Drains: * No LDAs found *    Findings:  Infection Present At Time Of Surgery (PATOS) (choose all levels that have

## 2025-03-27 NOTE — ANESTHESIA PRE PROCEDURE
Department of Anesthesiology  Preprocedure Note       Name:  Faiza Thorne   Age:  49 y.o.  :  1975                                          MRN:  [id number]         Date:  3/27/2025      Surgeon: Surgeon(s):  Ryan Rome MD    Procedure: Procedure(s):  RIGHT STEMLESS SHOULDER TOTAL ARTHROPLASTY; [[employer]]; NERVE BLOCK    Medications prior to admission:   Prior to Admission medications    Medication Sig Start Date End Date Taking? Authorizing Provider   acetaminophen (TYLENOL 8 HOUR ARTHRITIS PAIN) 650 MG extended release tablet Take 2 tablets by mouth in the morning and at bedtime And as needed   Yes Provider, MD Xavi   ibuprofen (ADVIL;MOTRIN) 200 MG tablet Take 4 tablets by mouth in the morning and at bedtime And prn   Yes Provider, MD Xavi   lisinopril-hydroCHLOROthiazide (PRINZIDE;ZESTORETIC) 20-25 MG per tablet Take 1 tablet by mouth daily 2/10/25  Yes Provider, Historical, MD   WEGOVY 1 MG/0.5ML SOAJ SC injection Inject 1 mg into the skin every 7 days Every 24  Yes Provider, MD Xavi   TREMFYA 100 MG/ML SOAJ Inject 100 mg into the skin every 2 months   Yes Provider, MD Xavi   traMADol (ULTRAM) 50 MG tablet Take 1 tablet by mouth every 6 hours. 24  Yes ProviderXavi MD   escitalopram (LEXAPRO) 20 MG tablet Take 1 tablet by mouth daily 25  Yes ProviderXavi MD   traZODone (DESYREL) 100 MG tablet Take 1 tablet by mouth nightly as needed   Yes Automatic Reconciliation, Ar   oxyCODONE (ROXICODONE) 5 MG immediate release tablet Take 1 tablet by mouth every 4 hours as needed for Pain for up to 7 days. Take lowest dose possible to manage pain for acute post operative pain. Do not take until after surgery Max Daily Amount: 30 mg  Patient not taking: Reported on 3/27/2025 3/21/25 3/28/25  Marlene Mclean, ACNP   celecoxib (CELEBREX) 200 MG capsule Take 1 capsule by mouth daily  Patient not taking: Reported on 3/14/2025

## 2025-03-27 NOTE — BRIEF OP NOTE
Brief Postoperative Note      Patient: Faiza Thorne  YOB: 1975  MRN: [id number]    Date of Procedure: 3/27/2025    Pre-Op Diagnosis Codes:      * Arthritis of right shoulder region [M19.011]    Post-Op Diagnosis:  Glenohumeral arthritis right shoulder       Procedure(s):  RIGHT STEMLESS SHOULDER TOTAL ARTHROPLASTY; [[employer]]; NERVE BLOCK    Surgeon(s):  Ryan Rome MD    Assistant:  Surgical Assistant: Rajesh Fraire Danny    Anesthesia: General    Estimated Blood Loss (mL): less than 100     Complications: None    Specimens:   * No specimens in log *    Implants:  Implant Name Type Inv. Item Serial No.  Lot No. LRB No. Used Action   CEMENT BNE 40 GM RADIOPAQUE BA SIMPLEX P - [id number]  CEMENT BNE 40 GM RADIOPAQUE BA SIMPLEX P  [employer] [facility] [id number] Right 1 Implanted   COMPONENT AMBER FIX [facility] VAULTLOCK - [id number]  COMPONENT AMBER FIX [facility] VAULTLOCK  [employer] [id number] Right 1 Implanted   ECLIPSE TRUNION 39MM SLOTTED TPS CAP - [id number]  ECLIPSE TRUNION 39MM SLOTTED TPS CAP  [employer] [id number] Right 1 Implanted   ECLIPSE CAGE SCREW M 35MM - [id number]  ECLIPSE CAGE SCREW M 35MM  [employer] [id number] Right 1 Implanted   SYSTEM IMPLANT ECLIPSE SPEEDCAP - [id number]  SYSTEM IMPLANT ECLIPSE SPEEDCAP  [employer] [id number] Right 1 Implanted   DEVICE GRFT FIX 4.75X19.1 MM ANCHR IMPL BIOCOMP SWIVELOCK - [id number]  DEVICE GRFT FIX 4.75X19.1 MM ANCHR IMPL BIOCOMP SWIVELOCK  [employer] [id number] Right 1 Implanted   ANCHOR SUTURE BIOCOMP 4.75X19.1 MM SWIVELOCK C - [id number]  ANCHOR SUTURE BIOCOMP 4.75X19.1 MM SWIVELOCK C  [employer] [id number] Right 1 Implanted   [employer] ECLIPSE HUMERAL HEAD 39/16 - [id number]  [employer] ECLIPSE HUMERAL HEAD 39/16  [employer] [id number] Right 1 Implanted         Drains: * No LDAs found *    Findings:  Infection Present At Time Of Surgery (PATOS) (choose all levels

## 2025-03-27 NOTE — H&P
Update History & Physical    The patient's History and Physical was reviewed with the patient and I examined the patient. There was no change. The surgical site was confirmed by the patient and me.       Plan: The risks, benefits, expected outcome, and alternative to the recommended procedure have been discussed with the patient. Patient understands and wants to proceed with the procedure.     Electronically signed by Ryan Rome MD on 3/27/2025 at 7:22 AM

## 2025-03-27 NOTE — ANESTHESIA PROCEDURE NOTES
Peripheral Block    Patient location during procedure: holding area  Reason for block: post-op pain management and at surgeon's request  Start time: 3/27/2025 7:20 AM  End time: 3/27/2025 7:28 AM  Staffing  Performed: anesthesiologist   Anesthesiologist: Suraj Rubalcava Jr., MD  Performed by: Suraj Rubalcava Jr., MD  Authorized by: Suraj Rubalcava Jr., MD    Preanesthetic Checklist  Completed: patient identified, IV checked, site marked, risks and benefits discussed, surgical/procedural consents, equipment checked, pre-op evaluation, timeout performed, anesthesia consent given, oxygen available, monitors applied/VS acknowledged, fire risk safety assessment completed and verbalized and blood product R/B/A discussed and consented  Peripheral Block   Patient position: supine  Prep: ChloraPrep  Provider prep: sterile gloves and mask  Patient monitoring: cardiac monitor, continuous pulse ox, frequent blood pressure checks, IV access, oxygen and responsive to questions  Block type: Brachial plexus  Interscalene  Laterality: right  Injection technique: single-shot  Guidance: nerve stimulator and ultrasound guided  Local infiltration: lidocaine  Infiltration strength: 1 %  Local infiltration: lidocaine  Dose: 1 mL    Needle   Needle type: insulated echogenic nerve stimulator needle   Needle gauge: 22 G  Needle localization: nerve stimulator, ultrasound guidance and anatomical landmarks  Test dose: negative  Needle length: 8 cm  Assessment   Injection assessment: negative aspiration for heme, no paresthesia on injection, local visualized surrounding nerve on ultrasound and no intravascular symptoms  Paresthesia pain: none  Slow fractionated injection: yes  Hemodynamics: stable  Outcomes: uncomplicated    Medications Administered  ropivacaine (NAROPIN) injection 0.5% - Perineural   20 mL - 3/27/2025 7:20:00 AM

## 2025-04-03 ENCOUNTER — OFFICE VISIT (OUTPATIENT)
Age: 50
End: 2025-04-03

## 2025-04-03 DIAGNOSIS — M25.511 RIGHT SHOULDER PAIN, UNSPECIFIED CHRONICITY: ICD-10-CM

## 2025-04-03 DIAGNOSIS — Z96.611 STATUS POST TOTAL SHOULDER ARTHROPLASTY, RIGHT: Primary | ICD-10-CM

## 2025-04-03 NOTE — PROGRESS NOTES
Faiza Thorne  1975     HISTORY OF PRESENT ILLNESS  Faiza Thorne is a 49 y.o. female who presents today for evaluation s/p right stemless total shoulder arthroplasty on 3/27/2025. Patient has not been going to PT. Describes pain as a 2/10. Has been taking tylenol for the pain and has only needed a couple doses of the oxycodone. Still has night pain. Patient denies any fever, chills, chest pain, shortness of breath or calf pain. The remainder of the review of systems is negative. There are no new illness or injuries to report since last seen in the office. No changes in medications, allergies, social or family history.      PHYSICAL EXAM:   [facility] 03/05/2025    The patient is a well-developed, well-nourished female in no acute distress.  The patient is alert and oriented times three. The patient appears to be well groomed. Mood and affect are normal.  ORTHOPEDIC EXAM of right shoulder:  Inspection: swelling present,  Bruising present  Incision, clean, dry, intact, sutures in place  Passive glenohumeral abduction 0-25 degrees  Stability: Stable  Strength: n/a  2+ distal pulses    IMAGING: 3 view xray images of right shoulder taken in the office on 4/3/2025 read and reviewed by myself reveal stemless total arthroplasty hardware in excellent position     IMPRESSION:  S/P right stemless total shoulder arthroplasty    PLAN:   Incisions cleaned. Surgery was discussed at length today. Patient to continue with PROM and PT. Continue wearing sling. Stressed to patient that nothing causes an increase in pain.  RTC 4 weeks    Marlene Mclean, MILTONP-C  [facility]

## 2025-04-03 NOTE — THERAPY EVALUATION
[facility]  [address] [place] [address]  Phone: [phone]   [phone]  Plan of Care / Statement of Necessity for Physical Therapy Services     Patient Name: Faiza Thorne : 1975   Medical   Diagnosis: Primary osteoarthritis of right shoulder Treatment Diagnosis:   M25.511  RIGHT SHOULDER PAIN    Onset Date: DOS: 3/27/25     Referral Source: Marlene Mclean ACNP Start of Care (SOC): 2025   Prior Hospitalization: See medical history Provider #: [id number]   Prior Level of Function: Lives with family. Previously independent ADL/IADL's. 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 RHD female with subjective complaints of R shoulder pain.  KIM: Pt reports chronic R shoulder pain and is now s/p stemless shoulder replacement DOS 3/27/25. Pt had 1st f/u last week with MD and everything is progressing well.   Pt s/p R TSA DOS 3/27/25.    Current pain is rated as 0 to 10/10. States post op pain has been averaging 2-3/10  Current functional limitations: R arm in immobilizer, no active motion. Limited ADL/IADL's. No lifting/reaching.   QuickDASH score= 75% indicating severe impairment to functional activities.    Today's evaulation is significant for:  Fall Risk Assessment: Does the patient have a fear of falling? NO.   If yes, what fall risk assessment was performed?  N/A      POSTURE/OBSERVATION:  Pt received with RUE in immobilizer; good fit. Pt reporting good compliance with use of immobilizer at all times    PROM   CERVICAL: Grossly appears WNL  SHOULDER: (supine)  Flex R 95 deg, L WNL, ABD R 84, L WNL, ER (scaption) R 10 deg, L WNL.  IR (scaption) R 58 prior to soft tissue approximation, L WNL    STRENGTH  SHOULDER: deferred MMT on R 2/2 post op status. L shoulder grossly 5/5   Wrist flex/ext R 4+/5    SPECIAL TESTS:  n/a      These findings

## 2025-04-07 ENCOUNTER — HOSPITAL ENCOUNTER (OUTPATIENT)
Facility: HOSPITAL | Age: 50
Setting detail: RECURRING SERIES
Discharge: HOME | End: 2025-04-10
Payer: COMMERCIAL

## 2025-04-07 PROCEDURE — 97140 MANUAL THERAPY 1/> REGIONS: CPT

## 2025-04-07 PROCEDURE — 97162 PT EVAL MOD COMPLEX 30 MIN: CPT

## 2025-04-07 NOTE — PROGRESS NOTES
PHYSICAL / OCCUPATIONAL THERAPY - DAILY TREATMENT NOTE (updated 3/2025)  For Eval visit    Patient Name: Faiza Thorne    Date: 2025    : 1975  Insurance: Payor: HERVE CAMPBELL / Plan: [facility] [place] / Product Type: *No Product type* /      Patient  verified yes     Visit #   Current / Total 1 16-24   Time   In / Out 9:00 9:30   Pain   In / Out 0 0   Subjective Functional Status/Changes: See POC     TREATMENT AREA =  see POC    OBJECTIVE        20 min   Eval - untimed                      Therapeutic Procedures:    Tx Min Billable or 1:1 Min (if diff from Tx Min) Procedure, Rationale, Specifics   10  89655 Manual Therapy (timed):  decrease pain, increase ROM, and increase tissue extensibility to improve patient's ability to progress to PLOF and address remaining functional goals.  The manual therapy interventions were performed at a separate and distinct time from the therapeutic activities interventions . (see flow sheet as applicable)     Details if applicable:              Details if applicable:            Details if applicable:            Details if applicable:     10  [facility] Totals Reminder: bill using total billable min of TIMED therapeutic procedures (example: do not include dry needle or estim unattended, both untimed codes, in totals to left)  8-22 min = 1 unit; 23-37 min = 2 units; 38-52 min = 3 units; 53-67 min = 4 units; 68-82 min = 5 units   Total Total     Charge Capture    [x]  Patient Education billed concurrently with other procedures   [x] Review HEP    [] Progressed/Changed HEP, detail:    [] Other detail:       Objective Information/Functional Measures/Assessment    See POC    Patient will continue to benefit from skilled PT / OT services to modify and progress therapeutic interventions, analyze and address functional mobility deficits, analyze and address ROM deficits, analyze and address strength deficits, analyze and address soft tissue restrictions, analyze and cue for proper movement

## 2025-04-08 NOTE — PROGRESS NOTES
PHYSICAL / OCCUPATIONAL THERAPY - DAILY TREATMENT NOTE    Patient Name: Faiza Thorne    Date: 2025    : 1975  Insurance: Payor: HERVE CAMPBELL / Plan: [facility] [place] / Product Type: *No Product type* /      Patient  verified Yes     Visit #   Current / Total 2 16-24   Time   In / Out 7:37 8:10   Pain   In / Out 0 0   Subjective Functional Status/Changes: Pt states she did her HEP 3x yesterday. Her mom left so she did a little more activity with opposite arm and so her R arm felt a little more sore. Pt reports compliance with use of R immobilizer     TREATMENT AREA =  Primary osteoarthritis of right shoulder  Pain in right shoulder    OBJECTIVE      Therapeutic Procedures:    Tx Min Billable or 1:1 Min (if diff from Tx Min) Procedure, Rationale, Specifics    19613 Therapeutic Exercise (timed):  increase ROM, strength, coordination, balance, and proprioception to improve patient's ability to progress to PLOF and address remaining functional goals. (see flow sheet as applicable)     Details if applicable:        72861 Manual Therapy (timed):  decrease pain, increase ROM, and increase tissue extensibility to improve patient's ability to progress to PLOF and address remaining functional goals.  The manual therapy interventions were performed at a separate and distinct time from the therapeutic activities interventions . (see flow sheet as applicable)     Details if applicable:    STM R UT, rhomboids, LS in L s/l. R shoulder PROM all planes in supine          Details if applicable:            Details if applicable:            Details if applicable:     33 33 [facility] Totals Reminder: bill using total billable min of TIMED therapeutic procedures (example: do not include dry needle or estim unattended, both untimed codes, in totals to left)  8-22 min = 1 unit; 23-37 min = 2 units; 38-52 min = 3 units; 53-67 min = 4 units; 68-82 min = 5 units   Total Total     Charge Capture    [x]  Patient Education billed

## 2025-04-09 ENCOUNTER — HOSPITAL ENCOUNTER (OUTPATIENT)
Facility: HOSPITAL | Age: 50
Setting detail: RECURRING SERIES
Discharge: HOME | End: 2025-04-12
Payer: COMMERCIAL

## 2025-04-09 PROCEDURE — 97110 THERAPEUTIC EXERCISES: CPT

## 2025-04-09 PROCEDURE — 97140 MANUAL THERAPY 1/> REGIONS: CPT

## 2025-04-16 ENCOUNTER — HOSPITAL ENCOUNTER (OUTPATIENT)
Facility: HOSPITAL | Age: 50
Setting detail: RECURRING SERIES
Discharge: HOME | End: 2025-04-19
Payer: COMMERCIAL

## 2025-04-16 PROCEDURE — 97110 THERAPEUTIC EXERCISES: CPT

## 2025-04-16 PROCEDURE — 97140 MANUAL THERAPY 1/> REGIONS: CPT

## 2025-04-16 NOTE — PROGRESS NOTES
PHYSICAL / OCCUPATIONAL THERAPY - DAILY TREATMENT NOTE    Patient Name: Faiza Thorne    Date: 2025    : 1975  Insurance: Payor: HERVE CAMPBELL / [place]: [facility] [place] / Product Type: *No Product type* /      Patient  verified Yes     Visit #   Current / Total 3 16-24   Time   In / Out 739 813   Pain   In / Out 0/10 010   Subjective Functional Status/Changes: Patient reports she has been doing well since her LV. Patient is pleased that her pain levels have remained low.      TREATMENT AREA =  Primary osteoarthritis of right shoulder  Pain in right shoulder    OBJECTIVE         Therapeutic Procedures:    Tx Min Billable or 1:1 Min (if diff from Tx Min) Procedure, Rationale, Specifics    22194 Therapeutic Exercise (timed):  increase ROM, strength, coordination, balance, and proprioception to improve patient's ability to progress to PLOF and address remaining functional goals. (see flow sheet as applicable)     Details if applicable:       10 10 60571 Manual Therapy (timed):  decrease pain, increase ROM, increase tissue extensibility, and decrease trigger points to improve patient's ability to progress to PLOF and address remaining functional goals.  The manual therapy interventions were performed at a separate and distinct time from the therapeutic activities interventions . (see flow sheet as applicable)     Details if applicable:  STM to R infraspinatus, R teres minor, R UT; R shoulder PROM into flexion, ER and abduction all performed in semi-reclined supine position          Details if applicable:            Details if applicable:            Details if applicable:       [facility] Totals Reminder: bill using total billable min of TIMED therapeutic procedures (example: do not include dry needle or estim unattended, both untimed codes, in totals to left)  8-22 min = 1 unit; 23-37 min = 2 units; 38-52 min = 3 units; 53-67 min = 4 units; 68-82 min = 5 units   Total Total     Charge Capture    [x]  Patient 
Measures/Assessment  -***    Patient will continue to benefit from skilled PT / OT services to modify and progress therapeutic interventions, analyze and address functional mobility deficits, analyze and address ROM deficits, analyze and address strength deficits, analyze and address soft tissue restrictions, analyze and cue for proper movement patterns, analyze and modify for postural abnormalities, and instruct in home and community integration to address functional deficits and attain remaining goals.    Progress toward goals / Updated goals:  []  See Progress Note/Recertification      Short Term Goals: To be accomplished in 4 WEEKS  1.  Pt will be educated in/compliant with appropriate HEP to decrease pain***, increase ROM, increase strength and return pt to PLOF.    [place] at last note[place]certification[place] [facility]  [place]:  4/9: Goal progressing: good initial compliance   2. Pt will increase R shoulder flex/abd PROM by >/= 25 deg in order to improve ADL's  Status at last note/certification: Flex R 95 deg, ABD R 84  Current:     3. Pt will improve R shoulder ER PROM by >/= 20 deg for improved reaching.  Status at last note/certification: ER (scaption) 10 deg.  Current:        Long Term Goals: To be accomplished in 8 WEEKS  1. Pt will improve QuickDASH score to </=55% to demo a significant improvement in functional activity tolerance.  Status at last note/certification: 75%  Current:    2. Pt will improve R shoulder flex/abd AROM to >/=140 deg for improved reaching.   Status at last note/certification: PROM: Flex R 95 deg, ABD R 84  Current:     3. Pt will improve R shoulder ER AROM to >/= 45 deg for reaching.  Status at last note/certification: PROM: (scaption) 10 deg  Current:     4. Pt will initiate R shoulder strengthening protocol as cleared by MD to promote ability to resume normal carry/lifting function.  Status at last note/certification: n/a  Current:        Next PN/ RC due 5/7/25  Auth due (visit

## 2025-04-18 ENCOUNTER — HOSPITAL ENCOUNTER (OUTPATIENT)
Facility: HOSPITAL | Age: 50
Setting detail: RECURRING SERIES
Discharge: HOME | End: 2025-04-21
Payer: COMMERCIAL

## 2025-04-18 PROCEDURE — 97110 THERAPEUTIC EXERCISES: CPT

## 2025-04-18 PROCEDURE — 97140 MANUAL THERAPY 1/> REGIONS: CPT

## 2025-04-18 NOTE — PROGRESS NOTES
PHYSICAL / OCCUPATIONAL THERAPY - DAILY TREATMENT NOTE    Patient Name: Faiza Thorne    Date: 2025    : 1975  Insurance: Payor: HERVE CAMPBELL / [place]: [facility] [place] / Product Type: *No Product type* /      Patient  verified Yes     Visit #   Current / Total 4 16-24   Time   In / Out 700 745   Pain   In / Out 0/10 0/10   Subjective Functional Status/Changes: Pt states shoulder feel good today and reports no pain. Reports no issues with current exercises. Pt states she is able to sleep through the night though she is still in her recliner.      TREATMENT AREA =  Primary osteoarthritis of right shoulder  Pain in right shoulder    OBJECTIVE         Therapeutic Procedures:    Tx Min Billable or 1:1 Min (if diff from Tx Min) Procedure, Rationale, Specifics   35  50779 Therapeutic Exercise (timed):  increase ROM, strength, coordination, balance, and proprioception to improve patient's ability to progress to PLOF and address remaining functional goals. (see flow sheet as applicable)     Details if applicable:       10  18819 Manual Therapy (timed):  decrease pain, increase ROM, increase tissue extensibility, and decrease trigger points to improve patient's ability to progress to PLOF and address remaining functional goals.  The manual therapy interventions were performed at a separate and distinct time from the therapeutic activities interventions . (see flow sheet as applicable)     Details if applicable:  STM to R infraspinatus, R teres minor, R UT; R shoulder PROM into flexion, ER and abduction all performed in semi-reclined supine position           Details if applicable:            Details if applicable:            Details if applicable:     45  [facility] Totals Reminder: bill using total billable min of TIMED therapeutic procedures (example: do not include dry needle or estim unattended, both untimed codes, in totals to left)  8-22 min = 1 unit; 23-37 min = 2 units; 38-52 min = 3 units; 53-67 min = 4 units; 68-82

## 2025-04-22 ENCOUNTER — APPOINTMENT (OUTPATIENT)
Facility: HOSPITAL | Age: 50
End: 2025-04-22
Payer: COMMERCIAL

## 2025-04-23 ENCOUNTER — HOSPITAL ENCOUNTER (OUTPATIENT)
Facility: HOSPITAL | Age: 50
Setting detail: RECURRING SERIES
Discharge: HOME | End: 2025-04-26
Payer: COMMERCIAL

## 2025-04-23 PROCEDURE — 97140 MANUAL THERAPY 1/> REGIONS: CPT

## 2025-04-23 PROCEDURE — 97110 THERAPEUTIC EXERCISES: CPT

## 2025-04-23 NOTE — PROGRESS NOTES
both untimed codes, in totals to left)  8-22 min = 1 unit; 23-37 min = 2 units; 38-52 min = 3 units; 53-67 min = 4 units; 68-82 min = 5 units   Total Total     Charge Capture    [x]  Patient Education billed concurrently with other procedures   [x] Review HEP    [] Progressed/Changed HEP, detail:    [] Other detail:       Objective Information/Functional Measures/Assessment  Flex 135, ER (scap plane) 30    Pt demonstrates good improvement in shoulder PROM today, achieving STG for ER. Pt advised on continued compliance with use of immobilizer to optimize tissue healing and reduce risk for injury. Pt reports understanding. Pt ed on light touch, sensory stimulation to scar to improve nerve sensation as she reports numbness. Pt overall progressing well for post op status and will continue as per protocol.      Patient will continue to benefit from skilled PT / OT services to modify and progress therapeutic interventions, analyze and address functional mobility deficits, analyze and address ROM deficits, and analyze and address strength deficits to address functional deficits and attain remaining goals.    Progress toward goals / Updated goals:  []  See Progress Note/Recertification    Short Term Goals: To be accomplished in 4 WEEKS  1.  Pt will be educated in/compliant with appropriate HEP to decrease pain, increase ROM, increase strength and return pt to PLOF.    Status at last note/certification: issued at [facility]  Current:  4/9: Goal progressing: good initial compliance   2. Pt will increase R shoulder flex/abd PROM by >/= 25 deg in order to improve ADL's  Status at last note/certification: Flex R 95 deg, ABD R 84  Current:   (4/16/25) R shoulder flex PROM grossly 100, abd grossly 90, progressing with goal  3. Pt will improve R shoulder ER PROM by >/= 20 deg for improved reaching.  Status at last note/certification: ER (scaption) 10 deg.  Current:  4/23: Goal Met: ER (scaption) 30 deg      Long Term Goals: To be

## 2025-04-24 NOTE — PROGRESS NOTES
PHYSICAL / OCCUPATIONAL THERAPY - DAILY TREATMENT NOTE    Patient Name: Faiza Thorne    Date: 2025    : 1975  Insurance: Payor: HERVE CAMPBELL / [place]: [facility] [place] / Product Type: *No Product type* /      Patient  verified Yes     Visit #   Current / Total 6 16-24   Time   In / Out 703 745   Pain   In / Out 0/10 0/10   Subjective Functional Status/Changes: Pt states shoulder feels good. Reports no pain today.     TREATMENT AREA =  Primary osteoarthritis of right shoulder  Pain in right shoulder    OBJECTIVE         Therapeutic Procedures:    Tx Min Billable or 1:1 Min (if diff from Tx Min) Procedure, Rationale, Specifics   32  70955 Therapeutic Exercise (timed):  increase ROM, strength, coordination, balance, and proprioception to improve patient's ability to progress to PLOF and address remaining functional goals. (see flow sheet as applicable)     Details if applicable:       10  70204 Manual Therapy (timed):  decrease pain, increase ROM, increase tissue extensibility, and decrease trigger points to improve patient's ability to progress to PLOF and address remaining functional goals.  The manual therapy interventions were performed at a separate and distinct time from the therapeutic activities interventions . (see flow sheet as applicable)     Details if applicable:  Scar massage to superior incision site, STM to R infraspinatus, R teres minor, R UT, R pec; R shoulder PROM into flexion, ER and abduction all performed in semi-reclined supine position           Details if applicable:            Details if applicable:            Details if applicable:     42  [facility] Totals Reminder: bill using total billable min of TIMED therapeutic procedures (example: do not include dry needle or estim unattended, both untimed codes, in totals to left)  8-22 min = 1 unit; 23-37 min = 2 units; 38-52 min = 3 units; 53-67 min = 4 units; 68-82 min = 5 units   Total Total     Charge Capture    [x]  Patient Education billed  Patient was informed of physician recommendations. Per pt she would like to keep scheduled apt with  to discuss further and will call to schedule apt with urogyne.

## 2025-04-25 ENCOUNTER — HOSPITAL ENCOUNTER (OUTPATIENT)
Facility: HOSPITAL | Age: 50
Setting detail: RECURRING SERIES
Discharge: HOME | End: 2025-04-28
Payer: COMMERCIAL

## 2025-04-25 PROCEDURE — 97110 THERAPEUTIC EXERCISES: CPT

## 2025-04-25 PROCEDURE — 97140 MANUAL THERAPY 1/> REGIONS: CPT

## 2025-04-28 ENCOUNTER — HOSPITAL ENCOUNTER (OUTPATIENT)
Facility: HOSPITAL | Age: 50
Setting detail: RECURRING SERIES
Discharge: HOME | End: 2025-05-01
Payer: COMMERCIAL

## 2025-04-28 PROCEDURE — 97140 MANUAL THERAPY 1/> REGIONS: CPT

## 2025-04-28 PROCEDURE — 97110 THERAPEUTIC EXERCISES: CPT

## 2025-04-28 NOTE — PROGRESS NOTES
Details if applicable:       10 28 23758 Manual Therapy (timed):  decrease pain, increase ROM, increase tissue extensibility, and decrease trigger points to improve patient's ability to progress to PLOF and address remaining functional goals.  The manual therapy interventions were performed at a separate and distinct time from the therapeutic activities interventions . (see flow sheet as applicable)     Details if applicable:   STM to R infraspinatus, UT, lev scap; scar massage; PROM flex, abd, ER (all in semi-reclined position with elbow prop)          Details if applicable:            Details if applicable:            Details if applicable:     38 38 [facility] Totals Reminder: bill using total billable min of TIMED therapeutic procedures (example: do not include dry needle or estim unattended, both untimed codes, in totals to left)  8-22 min = 1 unit; 23-37 min = 2 units; 38-52 min = 3 units; 53-67 min = 4 units; 68-82 min = 5 units   Total Total     Charge Capture    [x]  Patient Education billed concurrently with other procedures   [x] Review HEP    [] Progressed/Changed HEP, detail:    [] Other detail:       Objective Information/Functional Measures/Assessment    -added flexion table walk outs to gentle stretch  -educated pt in self scar massage  -guarded to abd and ER PROM but improved with increasing reps  -ER (scapular plane) PROM to ~ 15 deg, slight regression noted today    Pt opted for CP today following session due to feeling some discomfort (denied pain). Pt reporting feeling improvement following CP.     Patient will continue to benefit from skilled PT / OT services to modify and progress therapeutic interventions, analyze and address functional mobility deficits, analyze and address ROM deficits, and analyze and address strength deficits to address functional deficits and attain remaining goals.    Progress toward goals / Updated goals:  []  See Progress Note/Recertification    Short Term Goals: To be

## 2025-04-29 NOTE — THERAPY RECERTIFICATION
Pt will improve R shoulder flex/abd AROM to >/=140 deg for improved reaching.   Status at last note/certification: 4/30: Goal progressing:  PROM Flex 128 (p!),  (p! And scap elevation compensation)  Current:     3. Pt will improve R shoulder ER AROM to >/= 45 deg for reaching.  Status at last note/certification: 4/30: Goal progressing: ER (scaption) 35 (p!)  Current:     4. Pt will initiate R shoulder strengthening protocol as cleared by MD to promote ability to resume normal carry/lifting function.  Status at last note/certification: n/a  Current:         Frequency / Duration:   Patient to be seen   2   times per week for   8    WEEKS    RECOMMENDATIONS  Patient would benefit from the continuation of skilled rehab interventions for functional progress to achieving above stated clinically significant goals.  Continue per initial Plan of Care.    If you have any questions/comments please contact us directly.  Thank you for allowing us to assist in the care of your patient.    ROCHELLE MIJARES, PT       4/30/2025       8:44 AM

## 2025-04-29 NOTE — PROGRESS NOTES
PHYSICAL / OCCUPATIONAL THERAPY - DAILY TREATMENT NOTE    Patient Name: Faiza Thorne    Date: 2025    : 1975  Insurance: Payor: HERVE CAMPBELL / Plan: [facility] [place] / Product Type: *No Product type* /      Patient  verified Yes     Visit #   Current / Total 8 16-24   Time   In / Out 8:21 9:15   Pain   In / Out 0 0   Subjective Functional Status/Changes: See PN     TREATMENT AREA =  Primary osteoarthritis of right shoulder  Pain in right shoulder    OBJECTIVE    Modalities Rationale:     decrease inflammation and decrease pain to improve patient's ability to progress to PLOF and address remaining functional goals.     min [] Estim Unattended, type/location:                                      []  w/ice    []  w/heat    min [] Estim Attended, type/location:                                     []  w/US     []  w/ice    []  w/heat    []  TENS insruct      min []  Mechanical Traction: type/lbs                   []  pro   []  sup   []  int   []  cont    []  before manual    []  after manual    min []  Ultrasound, settings/location:     10 min  unbill [x]  Ice     []  Heat    location/position: To the R shoulder in reclined long sit    min []  Paraffin,  details:     min []  Vasopneumatic Device, press/temp:     min []  Whirlpool / Fluido:    If using vaso (only need to measure limb vaso being performed on)      pre-treatment girth :       post-treatment girth :       measured at (landmark location) :      min []  Other:    Skin assessment post-treatment:   Intact     Therapeutic Procedures:    Tx Min Billable or 1:1 Min (if diff from Tx Min) Procedure, Rationale, Specifics   20  81751 Therapeutic Exercise (timed):  increase ROM, strength, coordination, balance, and proprioception to improve patient's ability to progress to PLOF and address remaining functional goals. (see flow sheet as applicable)     Details if applicable:        18246 Manual Therapy (timed):  decrease pain, increase ROM, increase tissue

## 2025-04-30 ENCOUNTER — HOSPITAL ENCOUNTER (OUTPATIENT)
Facility: HOSPITAL | Age: 50
Setting detail: RECURRING SERIES
Discharge: HOME | End: 2025-05-03
Payer: COMMERCIAL

## 2025-04-30 PROCEDURE — 97530 THERAPEUTIC ACTIVITIES: CPT

## 2025-04-30 PROCEDURE — 97140 MANUAL THERAPY 1/> REGIONS: CPT

## 2025-04-30 PROCEDURE — 97110 THERAPEUTIC EXERCISES: CPT

## 2025-05-01 ENCOUNTER — OFFICE VISIT (OUTPATIENT)
Age: 50
End: 2025-05-01

## 2025-05-01 DIAGNOSIS — Z96.611 STATUS POST TOTAL SHOULDER ARTHROPLASTY, RIGHT: Primary | ICD-10-CM

## 2025-05-01 PROCEDURE — 99024 POSTOP FOLLOW-UP VISIT: CPT | Performed by: NURSE PRACTITIONER

## 2025-05-01 NOTE — PROGRESS NOTES
Faiza Thorne  1975     HISTORY OF PRESENT ILLNESS  Faiza Thorne is a 49 y.o. female who presents today for evaluation s/p right stemless total shoulder arthroplasty on 3/27/2025. Patient has been going to PT. Describes pain as a 0/10. This is the first time she has been pain free in 10 years. Still has night pain. Patient denies any fever, chills, chest pain, shortness of breath or calf pain. The remainder of the review of systems is negative. There are no new illness or injuries to report since last seen in the office. No changes in medications, allergies, social or family history.      PHYSICAL EXAM:   There were no vitals taken for this visit.   The patient is a well-developed, well-nourished female in no acute distress.  The patient is alert and oriented times three. The patient appears to be well groomed. Mood and affect are normal.  ORTHOPEDIC EXAM of right shoulder:  Inspection: swelling present,  Bruising present  Incision, clean, dry, intact, sutures in place  Passive flexion 0-125 degrees  Stability: Stable  Strength: n/a  2+ distal pulses    IMAGING: 3 view xray images of right shoulder taken in the office on 4/3/2025 read and reviewed by myself reveal stemless total arthroplasty hardware in excellent position     IMPRESSION:  S/P right stemless total shoulder arthroplasty    PLAN:   Patient to continue with PT. May progress to AAROM. Discontinue wearing sling. Stressed to patient not to do anything that causes an increase in pain.  RTC 4 weeks    Marlene Mclean, MILTONP-C  [facility]

## 2025-05-02 NOTE — PROGRESS NOTES
PHYSICAL / OCCUPATIONAL THERAPY - DAILY TREATMENT NOTE    Patient Name: Faiza Thorne    Date: 2025    : 1975  Insurance: Payor: HERVE CAMPBELL / Plan: [facility] / Product Type: *No Product type* /      Patient  verified Yes     Visit #   Current / Total 9 24   Time   In / Out 7:00 7:48   Pain   In / Out 1 0   Subjective Functional Status/Changes: Pt states doctor appt on Thurs went well. She was told to d/c sling and it has been a little sore at times, but overall doing OK.      TREATMENT AREA =  Primary osteoarthritis of right shoulder  Pain in right shoulder    OBJECTIVE    Modalities Rationale:     decrease inflammation and decrease pain to improve patient's ability to progress to PLOF and address remaining functional goals.     min [] Estim Unattended, type/location:                                      []  w/ice    []  w/heat    min [] Estim Attended, type/location:                                     []  w/US     []  w/ice    []  w/heat    []  TENS insruct      min []  Mechanical Traction: type/lbs                   []  pro   []  sup   []  int   []  cont    []  before manual    []  after manual    min []  Ultrasound, settings/location:     10 min  unbill [x]  Ice     []  Heat    location/position: To the R shoulder in reclined long sit    min []  Paraffin,  details:     min []  Vasopneumatic Device, press/temp:     min []  Whirlpool / Fluido:    If using vaso (only need to measure limb vaso being performed on)      pre-treatment girth :       post-treatment girth :       measured at (landmark location) :      min []  Other:    Skin assessment post-treatment:   Intact     Therapeutic Procedures:    Tx Min Billable or 1:1 Min (if diff from Tx Min) Procedure, Rationale, Specifics   28 45 [id number] Therapeutic Exercise (timed):  increase ROM, strength, coordination, balance, and proprioception to improve patient's ability to progress to PLOF and address remaining functional goals. (see flow sheet as

## 2025-05-05 ENCOUNTER — HOSPITAL ENCOUNTER (OUTPATIENT)
Facility: HOSPITAL | Age: 50
Setting detail: RECURRING SERIES
Discharge: HOME | End: 2025-05-08
Payer: COMMERCIAL

## 2025-05-05 PROCEDURE — 97140 MANUAL THERAPY 1/> REGIONS: CPT

## 2025-05-05 PROCEDURE — 97110 THERAPEUTIC EXERCISES: CPT

## 2025-05-08 NOTE — PROGRESS NOTES
PHYSICAL / OCCUPATIONAL THERAPY - DAILY TREATMENT NOTE    Patient Name: Faiza Thorne    Date: 2025    : 1975  Insurance: Payor: HERVE CAMPBELL / Plan: [facility] / Product Type: *No Product type* /      Patient  verified Yes     Visit #   Current / Total 10 24   Time   In / Out 700 755   Pain   In / Out 0/10 0/10   Subjective Functional Status/Changes: Pt states she was sore from new exercises added last visit but reports no increase in pain.      TREATMENT AREA =  Primary osteoarthritis of right shoulder  Pain in right shoulder    OBJECTIVE    Modalities Rationale:     decrease inflammation and decrease pain to improve patient's ability to progress to PLOF and address remaining functional goals.     min [] Estim Unattended, type/location:                                      []  w/ice    []  w/heat    min [] Estim Attended, type/location:                                     []  w/US     []  w/ice    []  w/heat    []  TENS insruct      min []  Mechanical Traction: type/lbs                   []  pro   []  sup   []  int   []  cont    []  before manual    []  after manual    min []  Ultrasound, settings/location:     10 min  unbill [x]  Ice     []  Heat    location/position: R shoulder; long sitting    min []  Paraffin,  details:     min []  Vasopneumatic Device, press/temp:     min []  Whirlpool / Fluido:    If using vaso (only need to measure limb vaso being performed on)      pre-treatment girth :       post-treatment girth :       measured at (landmark location) :      min []  Other:    Skin assessment post-treatment:   Intact      Therapeutic Procedures:    Tx Min Billable or 1:1 Min (if diff from Tx Min) Procedure, Rationale, Specifics   22  88902 Therapeutic Exercise (timed):  increase ROM, strength, coordination, balance, and proprioception to improve patient's ability to progress to PLOF and address remaining functional goals. (see flow sheet as applicable)     Details if applicable:       [phone] [id number]

## 2025-05-09 ENCOUNTER — HOSPITAL ENCOUNTER (OUTPATIENT)
Facility: HOSPITAL | Age: 50
Setting detail: RECURRING SERIES
Discharge: HOME | End: 2025-05-12
Payer: COMMERCIAL

## 2025-05-09 PROCEDURE — 97110 THERAPEUTIC EXERCISES: CPT

## 2025-05-09 PROCEDURE — 97140 MANUAL THERAPY 1/> REGIONS: CPT

## 2025-05-09 PROCEDURE — 97530 THERAPEUTIC ACTIVITIES: CPT

## 2025-05-13 ENCOUNTER — HOSPITAL ENCOUNTER (OUTPATIENT)
Facility: HOSPITAL | Age: 50
Setting detail: RECURRING SERIES
Discharge: HOME | End: 2025-05-16
Payer: COMMERCIAL

## 2025-05-13 PROCEDURE — 97530 THERAPEUTIC ACTIVITIES: CPT

## 2025-05-13 PROCEDURE — 97140 MANUAL THERAPY 1/> REGIONS: CPT

## 2025-05-13 PROCEDURE — 97110 THERAPEUTIC EXERCISES: CPT

## 2025-05-13 NOTE — PROGRESS NOTES
PHYSICAL / OCCUPATIONAL THERAPY - DAILY TREATMENT NOTE    Patient Name: Faiza Thorne    Date: 2025    : 1975  Insurance: Payor: HERVE CAMPBELL / Plan: [facility] / Product Type: *No Product type* /      Patient  verified Yes     Visit #   Current / Total 11 24   Time   In / Out 5:38 6:27   Pain   In / Out 0 0   Subjective Functional Status/Changes: Pt reported no new complaints, feeling better about \"being able to use it more\".     TREATMENT AREA =  Primary osteoarthritis of right shoulder  Pain in right shoulder    OBJECTIVE    Modalities Rationale:     decrease inflammation and decrease pain to improve patient's ability to progress to PLOF and address remaining functional goals.     min [] Estim Unattended, type/location:                                      []  w/ice    []  w/heat    min [] Estim Attended, type/location:                                     []  w/US     []  w/ice    []  w/heat    []  TENS insruct      min []  Mechanical Traction: type/lbs                   []  pro   []  sup   []  int   []  cont    []  before manual    []  after manual    min []  Ultrasound, settings/location:     10 min  unbill [x]  Ice     []  Heat    location/position: R shoulder; long sitting    min []  Paraffin,  details:     min []  Vasopneumatic Device, press/temp:     min []  Whirlpool / Fluido:    If using vaso (only need to measure limb vaso being performed on)      pre-treatment girth :       post-treatment girth :       measured at (landmark location) :      min []  Other:    Skin assessment post-treatment:   Intact      Therapeutic Procedures:    Tx Min Billable or 1:1 Min (if diff from Tx Min) Procedure, Rationale, Specifics    42880 Therapeutic Exercise (timed):  increase ROM, strength, coordination, balance, and proprioception to improve patient's ability to progress to PLOF and address remaining functional goals. (see flow sheet as applicable)     Details if applicable:       10 10 [phone]

## 2025-05-14 ENCOUNTER — APPOINTMENT (OUTPATIENT)
Facility: HOSPITAL | Age: 50
End: 2025-05-14
Payer: COMMERCIAL

## 2025-05-19 ENCOUNTER — APPOINTMENT (OUTPATIENT)
Facility: HOSPITAL | Age: 50
End: 2025-05-19
Payer: COMMERCIAL

## 2025-05-20 ENCOUNTER — HOSPITAL ENCOUNTER (OUTPATIENT)
Facility: HOSPITAL | Age: 50
Setting detail: RECURRING SERIES
Discharge: HOME | End: 2025-05-23
Payer: COMMERCIAL

## 2025-05-20 PROCEDURE — 97530 THERAPEUTIC ACTIVITIES: CPT

## 2025-05-20 PROCEDURE — 97140 MANUAL THERAPY 1/> REGIONS: CPT

## 2025-05-20 PROCEDURE — 97110 THERAPEUTIC EXERCISES: CPT

## 2025-05-20 NOTE — PROGRESS NOTES
PHYSICAL / OCCUPATIONAL THERAPY - DAILY TREATMENT NOTE    Patient Name: Faiza Thorne    Date: 2025    : 1975  Insurance: Payor: HERVE CAMPBELL / Plan: [facility] / Product Type: *No Product type* /      Patient  verified Yes     Visit #   Current / Total 12 24   Time   In / Out 306 359   Pain   In / Out 1/10 0/10   Subjective Functional Status/Changes: Pt reports no issues with new exercises      TREATMENT AREA =  Primary osteoarthritis of right shoulder  Pain in right shoulder    OBJECTIVE    Modalities Rationale:     decrease inflammation and decrease pain to improve patient's ability to progress to PLOF and address remaining functional goals.     min [] Estim Unattended, type/location:                                      []  w/ice    []  w/heat    min [] Estim Attended, type/location:                                     []  w/US     []  w/ice    []  w/heat    []  TENS insruct      min []  Mechanical Traction: type/lbs                   []  pro   []  sup   []  int   []  cont    []  before manual    []  after manual    min []  Ultrasound, settings/location:     10 min  unbill [x]  Ice     []  Heat    location/position: R shoulder; long sitting     min []  Paraffin,  details:     min []  Vasopneumatic Device, press/temp:     min []  Whirlpool / Fluido:    If using vaso (only need to measure limb vaso being performed on)      pre-treatment girth :       post-treatment girth :       measured at (landmark location) :      min []  Other:    Skin assessment post-treatment:   Intact      Therapeutic Procedures:    Tx Min Billable or 1:1 Min (if diff from Tx Min) Procedure, Rationale, Specifics   25 25 [id number] Therapeutic Exercise (timed):  increase ROM, strength, coordination, balance, and proprioception to improve patient's ability to progress to PLOF and address remaining functional goals. (see flow sheet as applicable)     Details if applicable:       10 10 66814 Therapeutic Activity (timed):  use of dynamic

## 2025-05-22 ENCOUNTER — HOSPITAL ENCOUNTER (OUTPATIENT)
Facility: HOSPITAL | Age: 50
Setting detail: RECURRING SERIES
Discharge: HOME | End: 2025-05-25
Payer: COMMERCIAL

## 2025-05-22 PROCEDURE — 97530 THERAPEUTIC ACTIVITIES: CPT

## 2025-05-22 PROCEDURE — 97110 THERAPEUTIC EXERCISES: CPT

## 2025-05-22 NOTE — PROGRESS NOTES
visits max manual therapy)    PLAN  - Continue Plan of Care  - Upgrade activities as tolerated    Magdi Shultz, SPT    5/22/2025    6:49 PM  ROCHELLE MIJARES, PT  05/23/25  8:16 AM   If an interpreting service was utilized for treatment of this patient, the contents of this document represent the material reviewed with the patient via the .     Future Appointments   Date Time Provider Department Center   6/3/2025  9:45 AM Marlene Mcelan, [facility]

## 2025-05-22 NOTE — THERAPY RECERTIFICATION
[facility]  [address] [place] [address]  Phone: [phone]   [phone]  PHYSICAL THERAPY PROGRESS NOTE  Patient Name: Faiza Thorne : 1975   Medical/Treatment Diagnosis: Primary osteoarthritis of right shoulder  Pain in right shoulder   [facility]     Date of Initial Visit: 25 Attended Visits: 12 Missed Visits: 0     SUMMARY OF TREATMENT  Physical therapy has consisted of therapeutic exercises for increased shoulder strength, ROM, and flexibility as per protocol. Therapeutic activities performed to improve functional activities, model real life movements and performance specific to the patient's need with supervision to return the patient to their PLOF. Neuromuscular Re-ed performed to improve coordination, improve mm activation, improve proprioception to improve the patient's ability to perform ADL/IADL's. Manual therapy for decreased muscle hypertonicity and increased ROM/flexibility. Cold pack provided PRN for palliative. Pt education provided regarding HEP.     CURRENT STATUS  Pt has participated in 12 sessions of skilled PT for dx: R shoulder pain s/p stemless shoulder TSA DOS 3/27/25. Pt reports good progress this period with improving strength and ROM as per protocol. Unfortunately, she reports she was in [employer] today when she was holding a case of soda in LUE and it slipped and she instinctively grabbed with RUE causing sharp pain. Pt states pain immediately dissipated and then has just been throbby/achy since. Upon exam, pt had no red flags or serious concerns. Mild ttp to biceps, however pt states this is usually a tender point. Was advised to rest and continue HEP as prescribed. Currently pt is 8 weeks post op, progressing well within post op expectations.      % improvement: 60%  Max pain 7/10 (when she inadvertently grabbed at case of soda in grocery store causing acute, brief, sharp

## 2025-05-27 ENCOUNTER — APPOINTMENT (OUTPATIENT)
Facility: HOSPITAL | Age: 50
End: 2025-05-27
Payer: COMMERCIAL

## 2025-05-29 ENCOUNTER — APPOINTMENT (OUTPATIENT)
Facility: HOSPITAL | Age: 50
End: 2025-05-29
Payer: COMMERCIAL

## 2025-05-30 ENCOUNTER — APPOINTMENT (OUTPATIENT)
Facility: HOSPITAL | Age: 50
End: 2025-05-30
Payer: COMMERCIAL

## 2025-05-30 NOTE — PROGRESS NOTES
PHYSICAL / OCCUPATIONAL THERAPY - DAILY TREATMENT NOTE    Patient Name: Faiza Thorne    Date: 2025    : 1975  Insurance: Payor: HERVE CAMPBELL / Plan: [facility] / Product Type: *No Product type* /      Patient  verified Yes     Visit #   Current / Total 14 24   Time   In / Out 9:40 10:20   Pain   In / Out 0 2   Subjective Functional Status/Changes: Pt reports no new complaints. States felt a \"pop\" and has been able to move shoulder more and it hase felt better.      TREATMENT AREA =  Primary osteoarthritis of right shoulder  Pain in right shoulder    OBJECTIVE    Modalities Rationale:     decrease inflammation and decrease pain to improve patient's ability to progress to PLOF and address remaining functional goals.     min [] Estim Unattended, type/location:                                      []  w/ice    []  w/heat    min [] Estim Attended, type/location:                                     []  w/US     []  w/ice    []  w/heat    []  TENS insruct      min []  Mechanical Traction: type/lbs                   []  pro   []  sup   []  int   []  cont    []  before manual    []  after manual    min []  Ultrasound, settings/location:     x min  unbill [x]  Ice     []  Heat    location/position: R shoulder; long sitting     min []  Paraffin,  details:     min []  Vasopneumatic Device, press/temp:     min []  Whirlpool / Fluido:    If using vaso (only need to measure limb vaso being performed on)      pre-treatment girth :       post-treatment girth :       measured at (landmark location) :      min []  Other:    Skin assessment post-treatment:   Intact      Therapeutic Procedures:    Tx Min Billable or 1:1 Min (if diff from Tx Min) Procedure, Rationale, Specifics   20  89882 Therapeutic Exercise (timed):  increase ROM, strength, coordination, balance, and proprioception to improve patient's ability to progress to PLOF and address remaining functional goals. (see flow sheet as applicable)     Details if

## 2025-06-02 ENCOUNTER — HOSPITAL ENCOUNTER (OUTPATIENT)
Facility: HOSPITAL | Age: 50
Setting detail: RECURRING SERIES
Discharge: HOME | End: 2025-06-05
Payer: COMMERCIAL

## 2025-06-02 PROCEDURE — 97140 MANUAL THERAPY 1/> REGIONS: CPT

## 2025-06-02 PROCEDURE — 97530 THERAPEUTIC ACTIVITIES: CPT

## 2025-06-02 PROCEDURE — 97110 THERAPEUTIC EXERCISES: CPT

## 2025-06-03 ENCOUNTER — OFFICE VISIT (OUTPATIENT)
Age: 50
End: 2025-06-03

## 2025-06-03 VITALS — BODY MASS INDEX: 50.2 KG/M2 | HEIGHT: 62 IN | WEIGHT: 272.8 LBS

## 2025-06-03 DIAGNOSIS — Z96.611 STATUS POST TOTAL SHOULDER ARTHROPLASTY, RIGHT: Primary | ICD-10-CM

## 2025-06-03 PROCEDURE — 99024 POSTOP FOLLOW-UP VISIT: CPT | Performed by: NURSE PRACTITIONER

## 2025-06-03 NOTE — PROGRESS NOTES
Faiza Thorne  1975     HISTORY OF PRESENT ILLNESS  Faiza Thorne is a 49 y.o. female who presents today for evaluation s/p right stemless total shoulder arthroplasty on 3/27/2025. Patient has been going to PT. Describes pain as a 0/10. This is the first time she has been pain free in 10 years. She is thrilled with the results. She does continue to have some restriction with ROM which she feels is at the attachment from her previous biceps tenodesis 3 years ago. Patient denies any fever, chills, chest pain, shortness of breath or calf pain. The remainder of the review of systems is negative. There are no new illness or injuries to report since last seen in the office. No changes in medications, allergies, social or family history.      PHYSICAL EXAM:   Ht 1.575 m (5' 2\")   Wt 123.7 kg (272 lb 12.8 oz)   BMI 49.90 kg/m²    The patient is a well-developed, well-nourished female in no acute distress.  The patient is alert and oriented times three. The patient appears to be well groomed. Mood and affect are normal.  ORTHOPEDIC EXAM of right shoulder:  Inspection: swelling not present,  Bruising not present  Incision healed  Passive flexion 0-125 degrees  Stability: Stable  Strength: 4/5  2+ distal pulses    IMAGING: 3 view xray images of right shoulder taken in the office on 4/3/2025 read and reviewed by myself reveal stemless total arthroplasty hardware in excellent position     IMPRESSION:  S/P right stemless total shoulder arthroplasty    PLAN:   Patient to continue with PT. Stressed to patient not to do anything that causes an increase in pain.  RTC 4 weeks    Marlene Mclean, MILTONP-C  [facility]

## 2025-06-04 NOTE — PROGRESS NOTES
PHYSICAL / OCCUPATIONAL THERAPY - DAILY TREATMENT NOTE    Patient Name: Faiza Thorne    Date: 2025    : 1975  Insurance: Payor: HERVE CAMPBELL / Plan: [facility] [place] / Product Type: *No Product type* /      Patient  verified Yes     Visit #   Current / Total 15 24   Time   In / Out *** ***   Pain   In / Out *** ***   Subjective Functional Status/Changes: ***     TREATMENT AREA =  Primary osteoarthritis of right shoulder  Pain in right shoulder    OBJECTIVE    Modalities Rationale:     decrease inflammation and decrease pain to improve patient's ability to progress to PLOF and address remaining functional goals.     min [] Estim Unattended, type/location:                                      []  w/ice    []  w/heat    min [] Estim Attended, type/location:                                     []  w/US     []  w/ice    []  w/heat    []  TENS insruct      min []  Mechanical Traction: type/lbs                   []  pro   []  sup   []  int   []  cont    []  before manual    []  after manual    min []  Ultrasound, settings/location:     *** min  unbill [x]  Ice     []  Heat    location/position: R shoulder; long sitting     min []  Paraffin,  details:     min []  Vasopneumatic Device, press/temp:     min []  Whirlpool / Fluido:    If using vaso (only need to measure limb vaso being performed on)      pre-treatment girth :       post-treatment girth :       measured at (landmark location) :      min []  Other:    Skin assessment post-treatment:   Intact      Therapeutic Procedures:    Tx Min Billable or 1:1 Min (if diff from Tx Min) Procedure, Rationale, Specifics   ***  77319 Therapeutic Exercise (timed):  increase ROM, strength, coordination, balance, and proprioception to improve patient's ability to progress to PLOF and address remaining functional goals. (see flow sheet as applicable)     Details if applicable:       ***  13613 Therapeutic Activity (timed):  use of dynamic activities replicating functional

## 2025-06-05 ENCOUNTER — HOSPITAL ENCOUNTER (OUTPATIENT)
Facility: HOSPITAL | Age: 50
Setting detail: RECURRING SERIES
End: 2025-06-05
Payer: COMMERCIAL

## 2025-06-11 ENCOUNTER — HOSPITAL ENCOUNTER (OUTPATIENT)
Facility: HOSPITAL | Age: 50
Setting detail: RECURRING SERIES
Discharge: HOME | End: 2025-06-14
Payer: COMMERCIAL

## 2025-06-11 PROCEDURE — 97110 THERAPEUTIC EXERCISES: CPT

## 2025-06-11 PROCEDURE — 97140 MANUAL THERAPY 1/> REGIONS: CPT

## 2025-06-12 NOTE — PROGRESS NOTES
59359 Therapeutic Activity (timed):  use of dynamic activities replicating functional movements to increase ROM, strength, coordination, balance, and proprioception in order to improve patient's ability to progress to PLOF and address remaining functional goals.  (see flow sheet as applicable)     Details if applicable:    Gross reassessment performed with pt education on compliance with HEP and progression of exercises with PT     90733 Manual Therapy (timed):  decrease pain, increase ROM, increase tissue extensibility, and decrease trigger points to improve patient's ability to progress to PLOF and address remaining functional goals.  The manual therapy interventions were performed at a separate and distinct time from the therapeutic activities interventions . (see flow sheet as applicable)     Details if applicable:               Details if applicable:            Details if applicable:     41 18 [facility] Totals Reminder: bill using total billable min of TIMED therapeutic procedures (example: do not include dry needle or estim unattended, both untimed codes, in totals to left)  8-22 min = 1 unit; 23-37 min = 2 units; 38-52 min = 3 units; 53-67 min = 4 units; 68-82 min = 5 units   Total Total     Charge Capture    [x]  Patient Education billed concurrently with other procedures   [x] Review HEP    [x] Progressed/Changed HEP, detail:  see chart copy  [] Other detail:       Objective Information/Functional Measures/Assessment  - pt VC to prevent UT compensation for flex/ scap AROM  - pt educated on continuing HEP  - pt grossly reassessed 2/2 asking for updates    Pt compensating abduction with UT at ~30 degrees in standing. Pt requested brief assessment of ROM and strength, has shown improvements in both since last reassessment. R shoulder flexion AROM in sitting to 125 with moderate scapular elevation, abduction to 90 with excessive UT scapular elevation. R shoulder flexion strength improvements demonstrated, listed in

## 2025-06-13 ENCOUNTER — HOSPITAL ENCOUNTER (OUTPATIENT)
Facility: HOSPITAL | Age: 50
Setting detail: RECURRING SERIES
Discharge: HOME | End: 2025-06-16
Payer: COMMERCIAL

## 2025-06-13 PROCEDURE — 97110 THERAPEUTIC EXERCISES: CPT

## 2025-06-16 NOTE — PROGRESS NOTES
PHYSICAL / OCCUPATIONAL THERAPY - DAILY TREATMENT NOTE    Patient Name: Faiza Thorne    Date: 2025    : 1975  Insurance: Payor: HERVE CAMPBELL / Plan: [facility] [place] / Product Type: *No Product type* /      Patient  verified Yes     Visit #   Current / Total 17 24   Time   In / Out *** ***   Pain   In / Out *** ***   Subjective Functional Status/Changes: ***     TREATMENT AREA =  Primary osteoarthritis of right shoulder  Pain in right shoulder    OBJECTIVE    Modalities Rationale:     decrease inflammation and decrease pain to improve patient's ability to progress to PLOF and address remaining functional goals.     min [] Estim Unattended, type/location:                                      []  w/ice    []  w/heat    min [] Estim Attended, type/location:                                     []  w/US     []  w/ice    []  w/heat    []  TENS insruct      min []  Mechanical Traction: type/lbs                   []  pro   []  sup   []  int   []  cont    []  before manual    []  after manual    min []  Ultrasound, settings/location:     X*** min  unbill [x]  Ice     []  Heat    location/position: R shoulder; long sitting     min []  Paraffin,  details:     min []  Vasopneumatic Device, press/temp:     min []  Whirlpool / Fluido:    If using vaso (only need to measure limb vaso being performed on)      pre-treatment girth :       post-treatment girth :       measured at (landmark location) :      min []  Other:    Skin assessment post-treatment:   Intact      Therapeutic Procedures:    Tx Min Billable or 1:1 Min (if diff from Tx Min) Procedure, Rationale, Specifics   *** *** 62506 Therapeutic Exercise (timed):  increase ROM, strength, coordination, balance, and proprioception to improve patient's ability to progress to PLOF and address remaining functional goals. (see flow sheet as applicable)     Details if applicable:       *** *** 52914 Therapeutic Activity (timed):  use of dynamic activities replicating

## 2025-06-17 ENCOUNTER — HOSPITAL ENCOUNTER (OUTPATIENT)
Facility: HOSPITAL | Age: 50
Setting detail: RECURRING SERIES
End: 2025-06-17
Payer: COMMERCIAL

## 2025-06-19 ENCOUNTER — APPOINTMENT (OUTPATIENT)
Facility: HOSPITAL | Age: 50
End: 2025-06-19
Payer: COMMERCIAL

## 2025-06-23 ENCOUNTER — HOSPITAL ENCOUNTER (OUTPATIENT)
Facility: HOSPITAL | Age: 50
Setting detail: RECURRING SERIES
Discharge: HOME | End: 2025-06-26
Payer: COMMERCIAL

## 2025-06-23 PROCEDURE — 97530 THERAPEUTIC ACTIVITIES: CPT

## 2025-06-23 NOTE — THERAPY DISCHARGE
PT DISCHARGE DAILY NOTE AND SUMMARY 3-25    If signature is requested please return to:    [facility]  Fax: [phone]    Date:2025  Patient name: Faiza Thorne Start of Care: 2025   Referral source: Marlene Mclean ACNP : 1975   Medical/Treatment Diagnosis: Primary osteoarthritis of right shoulder  Pain in right shoulder Onset Date:DOS: 3/27/25      Prior Hospitalization: see medical history Provider#: [id number]   Comorbidities: Other:  HTN   Prior Level of Function:Lives with family. Previously independent ADL/IADL's. 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 Reports no restrictions or limitations to ADL's and work related activities.     TREATMENT AREA =  Primary osteoarthritis of right shoulder  Pain in right shoulder      OBJECTIVE         Therapeutic Procedures:    Tx Min Billable or 1:1 Min (if diff from Tx Min) Procedure, Rationale, Specifics   26  [id number] Therapeutic Activity (timed):  use of dynamic activities replicating functional movements to increase ROM, strength, coordination, balance, and proprioception in order to improve patient's ability to progress to PLOF and address remaining functional goals.  (see flow sheet as applicable)     Details if applicable:  reassessment, QuickDASH            Details if applicable:            Details if applicable:            Details if applicable:            Details if applicable:     26  [facility] Totals Reminder: bill using total billable min of TIMED therapeutic procedures (example: do not include dry needle or estim unattended, both untimed codes, in

## 2025-06-26 ENCOUNTER — APPOINTMENT (OUTPATIENT)
Facility: HOSPITAL | Age: 50
End: 2025-06-26
Payer: COMMERCIAL

## 2025-07-15 ENCOUNTER — OFFICE VISIT (OUTPATIENT)
Age: 50
End: 2025-07-15
Payer: COMMERCIAL

## 2025-07-15 VITALS — BODY MASS INDEX: 50.24 KG/M2 | HEIGHT: 62 IN | WEIGHT: 273 LBS

## 2025-07-15 DIAGNOSIS — Z96.611 STATUS POST TOTAL SHOULDER ARTHROPLASTY, RIGHT: Primary | ICD-10-CM

## 2025-07-15 PROCEDURE — 99212 OFFICE O/P EST SF 10 MIN: CPT | Performed by: NURSE PRACTITIONER

## 2025-07-15 NOTE — PROGRESS NOTES
Faiza Thorne  1975     HISTORY OF PRESENT ILLNESS  aFiza Thorne is a 49 y.o. female who presents today for evaluation s/p right stemless total shoulder arthroplasty on 3/27/2025. Patient has completed PT. Describes pain as a 0/10. She does have some restriction with ROM reaching back, which she feels is at the attachment from her previous biceps tenodesis 3 years ago. Patient denies any fever, chills, chest pain, shortness of breath or calf pain. The remainder of the review of systems is negative. There are no new illness or injuries to report since last seen in the office. No changes in medications, allergies, social or family history.      PHYSICAL EXAM:   Ht 1.575 m (5' 2\")   Wt 123.8 kg (273 lb)   BMI 49.93 kg/m²    The patient is a well-developed, well-nourished female in no acute distress.  The patient is alert and oriented times three. The patient appears to be well groomed. Mood and affect are normal.  ORTHOPEDIC EXAM of right shoulder:  Inspection: swelling not present,  Bruising not present  Incision healed  Passive flexion 0-155 degrees  Stability: Stable  Strength: 4/5  2+ distal pulses    IMAGING: 3 view xray images of right shoulder taken in the office on 4/3/2025 read and reviewed by myself reveal stemless total arthroplasty hardware in excellent position     IMPRESSION:  S/P right stemless total shoulder arthroplasty    PLAN:   Patient to continue with HEP. Stressed to patient not to do anything that causes an increase in pain.  RTC prn    MILTON WilsonP-C  [facility]

## (undated) DEVICE — BRACE SHLDR L FA L15 16IN UNIV AIRMESH BRTH SLNG 15DEG ABD

## (undated) DEVICE — STERILE POLYISOPRENE POWDER-FREE SURGICAL GLOVES: Brand: PROTEXIS

## (undated) DEVICE — BRACE SHLDR M FA L135 145IN UNIV AIRMESH BRTH SLNG 15DEG

## (undated) DEVICE — SET PIN MOD GLEN SYS

## (undated) DEVICE — GLOVE ORTHO 8   MSG9480

## (undated) DEVICE — PIN GLENOID DYNANITE VIP 2.8MM

## (undated) DEVICE — SUTURE FIBERWIRE SZ 2 W/ TAPERED NEEDLE BLUE L38IN NONABSORB BLU L26.5MM 1/2 CIRCLE AR7200

## (undated) DEVICE — CLEAN UP KIT: Brand: MEDLINE INDUSTRIES, INC.

## (undated) DEVICE — Device

## (undated) DEVICE — DEVICE COBB SUCTION

## (undated) DEVICE — 4-PORT MANIFOLD: Brand: NEPTUNE 2

## (undated) DEVICE — BLADE, TONGUE, 6", STERILE: Brand: MEDLINE

## (undated) DEVICE — SUTURE VICRYL SZ 0 L27IN ABSRB UD L36MM CT-1 1/2 CIR J260H

## (undated) DEVICE — TRIAL SCREW CAGE SHOULDER ARTHROPLASTY STERILE ECLIPSE

## (undated) DEVICE — OPTIFOAM GENTLE SA, POSTOP, 4X10: Brand: MEDLINE

## (undated) DEVICE — LIQUIBAND RAPID ADHESIVE 36/CS 0.8ML: Brand: MEDLINE

## (undated) DEVICE — KIT SUT SZ 2 L38IN FIBERWIRE W/ TAPR NDL STR NIT LOOP MIC

## (undated) DEVICE — OSTEOAUGER BONE GRAFT HARVESTER 10MM

## (undated) DEVICE — BANDAGE,GAUZE,BULKEE II,4.5"X4.1YD,STRL: Brand: MEDLINE

## (undated) DEVICE — SUTURE MONOCRYL SZ 4-0 L27IN ABSRB UD L24MM PS-1 3/8 CIR PRIM Y935H

## (undated) DEVICE — SPONGE LAP W18XL18IN WHT COT 4 PLY FLD STRUNG RADPQ DISP ST 2 PER PACK

## (undated) DEVICE — SOLUTION IRRIG 1000ML 0.9% SOD CHL USP POUR PLAS BTL

## (undated) DEVICE — APPLICATOR MEDICATED 26 CC SOLUTION HI LT ORNG CHLORAPREP

## (undated) DEVICE — HANDPIECE SET WITH HIGH FLOW TIP AND SUCTION TUBE: Brand: INTERPULSE

## (undated) DEVICE — SOLUTION IRRIG 3000ML 0.9% SOD CHL FLX CONT 0797208] ICU MEDICAL INC]

## (undated) DEVICE — Device: Brand: JELCO

## (undated) DEVICE — ELECTRODE PT RET AD L9FT HI MOIST COND ADH HYDRGEL CORDED

## (undated) DEVICE — SUTURE VICRYL SZ 2-0 L36IN ABSRB UD L36MM CT-1 1/2 CIR J945H

## (undated) DEVICE — SYRINGE,TOOMEY,IRRIGATION,70CC,STERILE: Brand: MEDLINE

## (undated) DEVICE — NEEDLE SUT SZ 2 S STL MAYO CATGUT 1/2 CIR TAPR PT

## (undated) DEVICE — 2108 SERIES SAGITTAL BLADE (24.8 X 1.24 X 80.1MM)

## (undated) DEVICE — SUTURE VICRYL SZ 3-0 L27IN ABSRB UD L36MM CT-1 1/2 CIR J258H